# Patient Record
Sex: FEMALE | Race: WHITE | NOT HISPANIC OR LATINO | URBAN - METROPOLITAN AREA
[De-identification: names, ages, dates, MRNs, and addresses within clinical notes are randomized per-mention and may not be internally consistent; named-entity substitution may affect disease eponyms.]

---

## 2020-01-11 ENCOUNTER — CLINICAL SUPPORT (OUTPATIENT)
Dept: URGENT CARE | Facility: CLINIC | Age: 20
End: 2020-01-11

## 2020-01-11 VITALS
HEIGHT: 64 IN | BODY MASS INDEX: 35.17 KG/M2 | WEIGHT: 206 LBS | RESPIRATION RATE: 18 BRPM | DIASTOLIC BLOOD PRESSURE: 88 MMHG | TEMPERATURE: 98 F | SYSTOLIC BLOOD PRESSURE: 134 MMHG | HEART RATE: 98 BPM | OXYGEN SATURATION: 98 %

## 2020-01-11 DIAGNOSIS — L03.114 CELLULITIS OF LEFT UPPER EXTREMITY: Primary | ICD-10-CM

## 2020-01-11 DIAGNOSIS — L30.9 DERMATITIS: ICD-10-CM

## 2020-01-11 PROCEDURE — 99203 PR OFFICE/OUTPT VISIT, NEW, LEVL III, 30-44 MIN: ICD-10-PCS | Mod: S$GLB,,, | Performed by: NURSE PRACTITIONER

## 2020-01-11 PROCEDURE — 99203 OFFICE O/P NEW LOW 30 MIN: CPT | Mod: S$GLB,,, | Performed by: NURSE PRACTITIONER

## 2020-01-11 RX ORDER — CEPHALEXIN 500 MG/1
500 CAPSULE ORAL 4 TIMES DAILY
Qty: 20 CAPSULE | Refills: 0 | Status: SHIPPED | OUTPATIENT
Start: 2020-01-11 | End: 2020-01-16

## 2020-01-11 NOTE — PROGRESS NOTES
"Subjective:       Patient ID: Marizol Corbin is a 19 y.o. female.    Vitals:  height is 5' 4" (1.626 m) and weight is 93.4 kg (206 lb). Her temperature is 97.5 °F (36.4 °C). Her blood pressure is 134/88 and her pulse is 98. Her respiration is 18 and oxygen saturation is 98%.     Chief Complaint: Recurrent Skin Infections    Marizol Corbin is a 19 year old female presenting to the clinic with a slightly pruritic rash to the left forearm. She noticed it 1 week ago- rash around tattoo that pt just got a month ago. She has had no fever or drainage from the wound.    Tried using neosporin- no relief       Constitution: Negative for chills, fatigue and fever.   HENT: Negative for congestion and sore throat.    Neck: Negative for painful lymph nodes.   Cardiovascular: Negative for chest pain and leg swelling.   Eyes: Negative for double vision and blurred vision.   Respiratory: Negative for cough and shortness of breath.    Gastrointestinal: Negative for nausea, vomiting and diarrhea.   Genitourinary: Negative for dysuria, frequency, urgency and history of kidney stones.   Musculoskeletal: Negative for joint pain, joint swelling, muscle cramps and muscle ache.   Skin: Positive for lesion and erythema. Negative for color change, pale, rash and bruising.   Allergic/Immunologic: Negative for seasonal allergies.   Neurological: Negative for dizziness, history of vertigo, light-headedness, passing out and headaches.   Hematologic/Lymphatic: Negative for swollen lymph nodes.   Psychiatric/Behavioral: Negative for nervous/anxious, sleep disturbance and depression. The patient is not nervous/anxious.        Objective:      Physical Exam   Constitutional: She is oriented to person, place, and time. She appears well-developed and well-nourished. She is cooperative.  Non-toxic appearance. She does not appear ill. No distress.   HENT:   Head: Normocephalic and atraumatic.   Right Ear: Hearing, tympanic membrane, external ear and ear " canal normal.   Left Ear: Hearing, tympanic membrane, external ear and ear canal normal.   Nose: Nose normal. No mucosal edema, rhinorrhea or nasal deformity. No epistaxis. Right sinus exhibits no maxillary sinus tenderness and no frontal sinus tenderness. Left sinus exhibits no maxillary sinus tenderness and no frontal sinus tenderness.   Mouth/Throat: Uvula is midline, oropharynx is clear and moist and mucous membranes are normal. No trismus in the jaw. Normal dentition. No uvula swelling. No posterior oropharyngeal erythema.   Eyes: Conjunctivae and lids are normal. Right eye exhibits no discharge. Left eye exhibits no discharge. No scleral icterus.   Neck: Trachea normal, normal range of motion, full passive range of motion without pain and phonation normal. Neck supple.   Cardiovascular: Normal rate, regular rhythm, normal heart sounds, intact distal pulses and normal pulses.   Pulmonary/Chest: Effort normal and breath sounds normal. No respiratory distress.   Abdominal: Soft. Normal appearance and bowel sounds are normal. She exhibits no distension, no pulsatile midline mass and no mass. There is no tenderness.   Musculoskeletal: Normal range of motion. She exhibits no edema or deformity.   Neurological: She is alert and oriented to person, place, and time. She exhibits normal muscle tone. Coordination normal.   Skin: Skin is warm, dry, intact, not diaphoretic and not pale.   4 cm x 4 cm erythema over tattoo on left forearm. Small vesicles noted Lesions:  erythema  Psychiatric: She has a normal mood and affect. Her speech is normal and behavior is normal. Judgment and thought content normal. Cognition and memory are normal.   Nursing note and vitals reviewed.        Assessment:       1. Cellulitis of left upper extremity    2. Dermatitis        Plan:       Patient presents with cellulitis vs. Dermatitis over tattoo. No induration/fluctuance requiring I&D. Will start keflex and have patient start using  hydrocortisone if no improvement within 48 hours. Return as needed for recheck.   Cellulitis of left upper extremity    Dermatitis    Other orders  -     cephALEXin (KEFLEX) 500 MG capsule; Take 1 capsule (500 mg total) by mouth 4 (four) times daily. for 5 days  Dispense: 20 capsule; Refill: 0

## 2020-03-04 ENCOUNTER — CLINICAL SUPPORT (OUTPATIENT)
Dept: URGENT CARE | Facility: CLINIC | Age: 20
End: 2020-03-04

## 2020-03-04 VITALS
DIASTOLIC BLOOD PRESSURE: 79 MMHG | SYSTOLIC BLOOD PRESSURE: 121 MMHG | RESPIRATION RATE: 18 BRPM | TEMPERATURE: 99 F | BODY MASS INDEX: 34.49 KG/M2 | HEART RATE: 102 BPM | HEIGHT: 64 IN | WEIGHT: 202 LBS | OXYGEN SATURATION: 96 %

## 2020-03-04 DIAGNOSIS — R50.9 FEVER, UNSPECIFIED FEVER CAUSE: Primary | ICD-10-CM

## 2020-03-04 DIAGNOSIS — J10.1 INFLUENZA B: ICD-10-CM

## 2020-03-04 LAB
CTP QC/QA: YES
FLUAV AG NPH QL: NEGATIVE
FLUBV AG NPH QL: POSITIVE

## 2020-03-04 PROCEDURE — 87804 INFLUENZA ASSAY W/OPTIC: CPT | Mod: QW,,, | Performed by: NURSE PRACTITIONER

## 2020-03-04 PROCEDURE — 99214 PR OFFICE/OUTPT VISIT, EST, LEVL IV, 30-39 MIN: ICD-10-PCS | Mod: 25,TIER,S$GLB, | Performed by: NURSE PRACTITIONER

## 2020-03-04 PROCEDURE — 87804 POCT INFLUENZA A/B: ICD-10-PCS | Mod: 59,QW,, | Performed by: NURSE PRACTITIONER

## 2020-03-04 PROCEDURE — 99214 OFFICE O/P EST MOD 30 MIN: CPT | Mod: 25,TIER,S$GLB, | Performed by: NURSE PRACTITIONER

## 2020-03-04 RX ORDER — OSELTAMIVIR PHOSPHATE 75 MG/1
75 CAPSULE ORAL 2 TIMES DAILY
Qty: 10 CAPSULE | Refills: 0 | Status: SHIPPED | OUTPATIENT
Start: 2020-03-04 | End: 2020-03-09

## 2020-03-04 RX ORDER — FLUTICASONE PROPIONATE 50 MCG
2 SPRAY, SUSPENSION (ML) NASAL DAILY
Qty: 15.8 ML | Refills: 0 | Status: SHIPPED | OUTPATIENT
Start: 2020-03-04 | End: 2021-01-11 | Stop reason: SDUPTHER

## 2020-03-04 RX ORDER — LORATADINE 10 MG/1
10 TABLET ORAL DAILY
COMMUNITY
End: 2020-03-04 | Stop reason: ALTCHOICE

## 2020-03-04 RX ORDER — CETIRIZINE HYDROCHLORIDE 10 MG/1
10 TABLET ORAL DAILY
Qty: 30 TABLET | Refills: 0 | Status: SHIPPED | OUTPATIENT
Start: 2020-03-04 | End: 2020-04-03

## 2020-03-04 NOTE — PROGRESS NOTES
"Subjective:       Patient ID: Marizol Corbin is a 19 y.o. female.    Vitals:  height is 5' 4" (1.626 m) and weight is 91.6 kg (202 lb). Her oral temperature is 98.6 °F (37 °C). Her blood pressure is 121/79 and her pulse is 102. Her respiration is 18 and oxygen saturation is 96%.     Chief Complaint: Fever    Patient complains of sinus congestion, cough and fever that began yesterday.     Fever    This is a new problem. The current episode started yesterday. The problem occurs constantly. The problem has been gradually worsening. The maximum temperature noted was 101 to 101.9 F. The temperature was taken using an oral thermometer. Associated symptoms include congestion, coughing and muscle aches. Pertinent negatives include no abdominal pain, chest pain, diarrhea, headaches, nausea, rash, sore throat, urinary pain or vomiting. She has tried acetaminophen for the symptoms. The treatment provided no relief.       Constitution: Positive for fever. Negative for chills and fatigue.   HENT: Positive for congestion. Negative for sore throat.    Neck: Negative for painful lymph nodes.   Cardiovascular: Negative for chest pain and leg swelling.   Eyes: Negative for double vision and blurred vision.   Respiratory: Positive for cough. Negative for shortness of breath.    Gastrointestinal: Negative for abdominal pain, nausea, vomiting and diarrhea.   Endocrine: negative.   Genitourinary: Negative for dysuria, frequency, urgency and history of kidney stones.   Musculoskeletal: Negative for joint pain, joint swelling, muscle cramps and muscle ache.   Skin: Negative for color change, pale, rash and bruising.   Allergic/Immunologic: Negative for seasonal allergies.   Neurological: Negative for dizziness, history of vertigo, light-headedness, passing out and headaches.   Hematologic/Lymphatic: Negative for swollen lymph nodes.   Psychiatric/Behavioral: Negative for nervous/anxious, sleep disturbance and depression. The patient is " not nervous/anxious.        Objective:      Physical Exam   Constitutional: She is oriented to person, place, and time. She appears well-developed and well-nourished. She is cooperative.  Non-toxic appearance. She does not appear ill. No distress.   HENT:   Head: Normocephalic and atraumatic.   Right Ear: Hearing, tympanic membrane, external ear and ear canal normal.   Left Ear: Hearing, tympanic membrane, external ear and ear canal normal.   Nose: Mucosal edema and rhinorrhea present. No nasal deformity. No epistaxis. Right sinus exhibits no maxillary sinus tenderness and no frontal sinus tenderness. Left sinus exhibits no maxillary sinus tenderness and no frontal sinus tenderness.   Mouth/Throat: Uvula is midline, oropharynx is clear and moist and mucous membranes are normal. No trismus in the jaw. Normal dentition. No uvula swelling. Cobblestoning present. No posterior oropharyngeal erythema.   Eyes: Conjunctivae and lids are normal. Right eye exhibits no discharge. Left eye exhibits no discharge. No scleral icterus.   Neck: Trachea normal, normal range of motion, full passive range of motion without pain and phonation normal. Neck supple.   Cardiovascular: Normal rate, regular rhythm, normal heart sounds, intact distal pulses and normal pulses.   Pulmonary/Chest: Effort normal and breath sounds normal. No respiratory distress.   Abdominal: Soft. Normal appearance and bowel sounds are normal. She exhibits no distension, no pulsatile midline mass and no mass. There is no tenderness.   Musculoskeletal: Normal range of motion. She exhibits no edema or deformity.   Neurological: She is alert and oriented to person, place, and time. She exhibits normal muscle tone. Coordination normal. GCS eye subscore is 4. GCS verbal subscore is 5. GCS motor subscore is 6.   Skin: Skin is warm, dry, intact, not diaphoretic and not pale.   Psychiatric: She has a normal mood and affect. Her speech is normal and behavior is normal.  Judgment and thought content normal. Cognition and memory are normal.   Nursing note and vitals reviewed.        Assessment:       1. Fever, unspecified fever cause    2. Influenza B        Plan:       Influenza B positive.    Patient is flu positive. Discussed the importance of pushing fluids to stay hydrated, controlling fever with tylenol/motrin, and good handwashing to prevent spread of virus.    Fever, unspecified fever cause  -     POCT Influenza A/B    Influenza B    Other orders  -     cetirizine (ZYRTEC) 10 MG tablet; Take 1 tablet (10 mg total) by mouth once daily.  Dispense: 30 tablet; Refill: 0  -     fluticasone propionate (FLONASE) 50 mcg/actuation nasal spray; 2 sprays (100 mcg total) by Each Nostril route once daily.  Dispense: 15.8 mL; Refill: 0  -     dexchlorphen-phenylephrine-DM (POLYTUSSIN DM) 1-5-10 mg/5 mL Syrp; Take 5 mLs by mouth every 4 (four) hours as needed.  Dispense: 120 mL; Refill: 0  -     oseltamivir (TAMIFLU) 75 MG capsule; Take 1 capsule (75 mg total) by mouth 2 (two) times daily. for 5 days  Dispense: 10 capsule; Refill: 0

## 2020-03-04 NOTE — LETTER
March 4, 2020      [facility]  [address]  [facility] [address]  Phone: [phone]       Patient: Marizol Corbin   YOB: 2000  Date of Visit: 03/04/2020    To Whom It May Concern:    LUNA Corbin  was at [facility] on 03/04/2020. She may return to work/school on 3/11/20 with no restrictions. If you have any questions or concerns, or if I can be of further assistance, please do not hesitate to contact me.    Sincerely,    Shilpi Fay, NP

## 2020-03-04 NOTE — PATIENT INSTRUCTIONS
The Flu (Influenza)     The virus that causes the flu spreads through the air in droplets when someone who has the flu coughs, sneezes, laughs, or talks.   The flu (influenza) is an infection that affects your respiratory tract. This tract is made up of your mouth, nose, and lungs, and the passages between them. Unlike a cold, the flu can make you very ill. And it can lead to pneumonia, a serious lung infection. The flu can have serious complications and even cause death.  Who is at risk for the flu?  Anyone can get the flu. But you are more likely to become infected if you:  · Have a weakened immune system  · Work in a healthcare setting where you may be exposed to flu germs  · Live or work with someone who has the flu  · Havent had an annual flu shot  How does the flu spread?  The flu is caused by a virus. The virus spreads through the air in droplets when someone who has the flu coughs, sneezes, laughs, or talks. You can become infected when you inhale these viruses directly. You can also become infected when you touch a surface on which the droplets have landed and then transfer the germs to your eyes, nose, or mouth. Touching used tissues, or sharing utensils, drinking glasses, or a toothbrush from an infected person can expose you to flu viruses, too.  What are the symptoms of the flu?  Flu symptoms tend to come on quickly and may last a few days to a few weeks. They include:  · Fever usually higher than 100.4°F  (38°C) and chills  · Sore throat and headache  · Dry cough  · Runny nose  · Tiredness and weakness  · Muscle aches  Who is at risk for flu complications?  For some people, the flu can be very serious. The risk for complications is greater for:  · Children younger than age 5  · Adults ages 65 and older  · People with a chronic illness such as diabetes or heart, kidney, or lung disease  · People who live in a nursing home or long-term care facility   How is the flu treated?  The flu usually gets  better after 7 days or so. In some cases, your healthcare provider may prescribe an antiviral medicine. This may help you get well a little sooner. For the medicine to help, you need to take it as soon as possible (ideally within 48 hours) after your symptoms start. If you develop pneumonia or other serious illness, you may need to stay in the hospital.  Easing flu symptoms  · Drink lots of fluids such as water, juice, and warm soup. A good rule is to drink enough so that you urinate your normal amount.  · Get plenty of rest.  · Ask your healthcare provider what to take for fever and pain.  · Call your provider if your fever is 100.4°F (38°C) or higher, or you become dizzy, lightheaded, or short of breath.  Taking steps to protect others  · Wash your hands often, especially after coughing or sneezing. Or clean your hands with an alcohol-based hand  containing at least 60% alcohol.  · Cough or sneeze into a tissue. Then throw the tissue away and wash your hands. If you dont have a tissue, cough and sneeze into your elbow.  · Stay home until at least 24 hours after you no longer have a fever or chills. Be sure the fever isnt being hidden by fever-reducing medicine.  · Dont share food, utensils, drinking glasses, or a toothbrush with others.  · Ask your healthcare provider if others in your household should get antiviral medicine to help them avoid infection.  How can the flu be prevented?  · One of the best ways to avoid the flu is to get a flu vaccine each year. The virus that causes the flu changes from year to year. For that reason, healthcare providers recommend getting the flu vaccine each year, as soon as it's available in your area. The vaccine is given as a shot. Your healthcare provider can tell you which vaccine is right for you. A nasal spray is also available but is not recommended for the 8963-1303 flu season. The CDC says this is because the nasal spray did not seem to protect against the flu  over the last several flu seasons. In the past, it was meant for people ages 2 to 49.  · Wash your hands often. Frequent handwashing is a proven way to help prevent infection.  · Carry an alcohol-based hand gel containing at least 60% alcohol. Use it when you can't use soap and water. Then wash your hands as soon as you can.  · Avoid touching your eyes, nose, and mouth.  · At home and work, clean phones, computer keyboards, and toys often with disinfectant wipes.  · If possible, avoid close contact with others who have the flu or symptoms of the flu.  Handwashing tips  Handwashing is one of the best ways to prevent many common infections. If you are caring for or visiting someone with the flu, wash your hands each time you enter and leave the room. Follow these steps:  · Use warm water and plenty of soap. Rub your hands together well.  · Clean the whole hand, including under your nails, between your fingers, and up the wrists.  · Wash for at least 15 seconds.  · Rinse, letting the water run down your fingers, not up your wrists.  · Dry your hands well. Use a paper towel to turn off the faucet and open the door.  Using alcohol-based hand   Alcohol-based hand  are also a good choice. Use them when you can't use soap and water. Follow these steps:  · Squeeze about a tablespoon of gel into the palm of one hand.  · Rub your hands together briskly, cleaning the backs of your hands, the palms, between your fingers, and up the wrists.  · Rub until the gel is gone and your hands are completely dry.  Preventing the flu in healthcare settings  The flu is a special concern for people in hospitals and long-term care facilities. To help prevent the spread of flu, many hospitals and nursing homes take these steps:  · Healthcare providers wash their hands or use an alcohol-based hand  before and after treating each patient.  · People with the flu have private rooms and bathrooms or share a room with someone  with the same infection.  · People who are at high risk for the flu but don't have it are encouraged to get the flu and pneumonia vaccines.  · All healthcare workers are encouraged or required to get flu shots.   Date Last Reviewed: 12/1/2016 © 2000-2017 [employer]. [address] [place] [address]. All rights reserved. This information is not intended as a substitute for professional medical care. Always follow your healthcare professional's instructions.        Fever Control (Adult)  A fever is a normal reaction of your body to an illness. The temperature itself usually isnt harmful.  It actually helps your body fight infections. You dont need to treat a fever unless you feel very uncomfortable.   Home care  Follow these tips to take care of yourself at home:  · If you feel warm, check your temperature.  · Dress in light clothing. This will help you lose extra body heat through your skin. The fever will go up if you wear extra layers or wrap in blankets.  · Fever causes your body to lose water through evaporation. Drink plenty of fluids. These include water, juice, clear sodas, ginger ale, or lemonade.  Fever medicines  You can take acetaminophen every 4 to 6 hours if:  · You feel very uncomfortable  · Your oral temperature is 100.4ºF (38ºC) or higher  If you cant take or keep down oral medicine, ask your pharmacist for acetaminophen suppositories. You dont need a prescription for these.  If the fever doesnt get better within 1 hour after you take acetaminophen, take ibuprofen. If this works, keep taking the ibuprofen every 6 to 8 hours.  Note: If you have chronic liver or kidney disease, talk with your healthcare provider before taking these medicines. Also talk with your provider if you ever had a stomach ulcer or GI (gastrointestinal) bleeding.  If either medicine alone doesnt keep the fever down, you may switch off between the 2 medicines every 3 to 4 hours. But do this only if  your healthcare provider has told you to. For example, take ibuprofen. Wait 3 hours. Then take acetaminophen. Wait 3 hours. Take ibuprofen, and so on. Follow your providers instructions exactly.  Note: Do not give aspirin to anyone younger than age 19 who is ill with a fever. Aspirin can cause serious side effects such as liver damage and Reye syndrome. Although rare, Reye syndrome is a very serious illness usually found in children younger than age 15. The syndrome is closely linked to the use of aspirin or aspirin-containing medicine during viral infection.  Follow-up care  Follow up with your healthcare provider if you don't get better after 48 hours.  When to seek medical advice  Call your healthcare provider right away if any of these occur:  · Fever, as directed by your healthcare provider, or:  ¨ Fever of 100.4°F (38°C) or above lasting for 24 to 48 hours  ¨ Fever lasting more than 3 days, even without other symptoms  ¨ Fever that happens after visiting a foreign country  ¨ Fever that happens within a month after visiting a country with malaria. Malaria is a serious illness. A fever can still be malaria even if you took medicine to prevent it. The medicine does not work in all cases.  · Confusion or trouble thinking  · Headache or stiff neck  · Flat, small, purplish red spots on your skin  · Low blood pressure  · Fast heart rate  · Fast (rapid) breathing  · You are pregnant  · You just had surgery, another medical procedure, or were just discharged from the hospital  · Use of medicines that suppress the immune system (immunosuppressants). These include Prednisone, cancer medicines, and organ transplant rejection medicines. If you are not sure about whether your medicines suppress your immune system, ask your healthcare provider.  Call 911  Someone should call 911 if you:  · Are having trouble breathing or shortness of breath  · Are unresponsive  Important reminder  Call your healthcare provider if you get a  fever after visiting a place where infectious diseases are common. Many people  a cold or other virus while traveling. This usually goes away without a problem. But, some places have more serious diseases. Fever with certain other symptoms may mean you have a serious illness. Symptoms to watch for include diarrhea, skin rashes, insect bites, and skin boils, or infections. Your provider may ask you:  · What you did on your trip  · How long you were there  · Where you stayed (hotel, native house, tent)  · What you ate and drank  · If you were bitten by insects or other bugs  · If you swam in freshwater  · If you had sex or got a tattoo or piercing while you were there  Check the Centers for Disease Control and Prevention to get more information about specific infectious diseases in the areas you have traveled.  Date Last Reviewed: 1/1/2017  © 1237-4936 [employer]. [address] [place] [address], [place] [address]. All rights reserved. This information is not intended as a substitute for professional medical care. Always follow your healthcare professional's instructions.

## 2020-05-20 ENCOUNTER — CLINICAL SUPPORT (OUTPATIENT)
Dept: URGENT CARE | Facility: CLINIC | Age: 20
End: 2020-05-20

## 2020-05-20 VITALS
RESPIRATION RATE: 19 BRPM | HEIGHT: 64 IN | BODY MASS INDEX: 34.83 KG/M2 | TEMPERATURE: 98 F | HEART RATE: 83 BPM | OXYGEN SATURATION: 99 % | DIASTOLIC BLOOD PRESSURE: 75 MMHG | WEIGHT: 204 LBS | SYSTOLIC BLOOD PRESSURE: 124 MMHG

## 2020-05-20 DIAGNOSIS — H60.503 ACUTE OTITIS EXTERNA OF BOTH EARS, UNSPECIFIED TYPE: Primary | ICD-10-CM

## 2020-05-20 PROCEDURE — 99214 OFFICE O/P EST MOD 30 MIN: CPT | Mod: TIER,S$GLB,, | Performed by: NURSE PRACTITIONER

## 2020-05-20 PROCEDURE — 99214 PR OFFICE/OUTPT VISIT, EST, LEVL IV, 30-39 MIN: ICD-10-PCS | Mod: TIER,S$GLB,, | Performed by: NURSE PRACTITIONER

## 2020-05-20 RX ORDER — NEOMYCIN SULFATE, POLYMYXIN B SULFATE, HYDROCORTISONE 3.5; 10000; 1 MG/ML; [USP'U]/ML; MG/ML
4 SOLUTION/ DROPS AURICULAR (OTIC) 3 TIMES DAILY
Qty: 10 ML | Refills: 0 | Status: SHIPPED | OUTPATIENT
Start: 2020-05-20 | End: 2020-05-30

## 2020-05-20 NOTE — PATIENT INSTRUCTIONS

## 2020-05-20 NOTE — PROGRESS NOTES
"Subjective:       Patient ID: Marizol Corbin is a 19 y.o. female.    Vitals:  height is 5' 4" (1.626 m) and weight is 92.5 kg (204 lb). Her temperature is 97.6 °F (36.4 °C). Her blood pressure is 124/75 and her pulse is 83. Her respiration is 19 and oxygen saturation is 99%.     Chief Complaint: Otalgia    Otalgia    There is pain in the left ear. The current episode started more than 1 month ago. Pertinent negatives include no coughing, diarrhea, headaches, rash, sore throat or vomiting. Associated symptoms comments: Ear pain that radiates to jaw, headache. Treatments tried: sinus med  The treatment provided no relief.       Constitution: Negative for appetite change, chills and fever.   HENT: Positive for ear pain. Negative for congestion and sore throat.    Neck: Negative for painful lymph nodes.   Eyes: Negative for eye discharge and eye redness.   Respiratory: Negative for cough.    Gastrointestinal: Negative for vomiting and diarrhea.   Genitourinary: Negative for dysuria.   Musculoskeletal: Negative for muscle ache.   Skin: Negative for rash and erythema.   Neurological: Negative for headaches and seizures.   Hematologic/Lymphatic: Negative for swollen lymph nodes.       Objective:      Physical Exam   Constitutional: She is oriented to person, place, and time. She appears well-developed and well-nourished.   HENT:   Head: Normocephalic and atraumatic. Head is without abrasion, without contusion and without laceration.   Right Ear: Hearing, tympanic membrane, external ear and ear canal normal. There is tenderness (erythematous and swollen to ear canal).   Left Ear: Hearing, tympanic membrane, external ear and ear canal normal. There is tenderness (erythematous and swollen to ear canal).   Nose: Nose normal.   Mouth/Throat: Oropharynx is clear and moist and mucous membranes are normal.   Eyes: Pupils are equal, round, and reactive to light. Conjunctivae, EOM and lids are normal.   Neck: Trachea normal, full " passive range of motion without pain and phonation normal. Neck supple.   Cardiovascular: Normal rate, regular rhythm and normal heart sounds.   Pulmonary/Chest: Effort normal and breath sounds normal. No stridor. No respiratory distress.   Musculoskeletal: Normal range of motion.   Neurological: She is alert and oriented to person, place, and time.   Skin: Skin is warm, dry, intact and no rash. Capillary refill takes less than 2 seconds. abrasion, burn, bruising, erythema and ecchymosis  Psychiatric: She has a normal mood and affect. Her speech is normal and behavior is normal. Judgment and thought content normal. Cognition and memory are normal.   Nursing note and vitals reviewed.        Assessment:       1. Acute otitis externa of both ears, unspecified type        Plan:         Acute otitis externa of both ears, unspecified type    Other orders  -     neomycin-polymyxin-hydrocortisone (CORTISPORIN) otic solution; Place 4 drops into both ears 3 (three) times daily. for 10 days  Dispense: 10 mL; Refill: 0

## 2021-01-11 ENCOUNTER — OFFICE VISIT (OUTPATIENT)
Dept: URGENT CARE | Facility: CLINIC | Age: 21
End: 2021-01-11
Payer: MEDICAID

## 2021-01-11 VITALS
OXYGEN SATURATION: 99 % | SYSTOLIC BLOOD PRESSURE: 124 MMHG | DIASTOLIC BLOOD PRESSURE: 78 MMHG | BODY MASS INDEX: 35 KG/M2 | HEIGHT: 64 IN | WEIGHT: 205 LBS | TEMPERATURE: 98 F | RESPIRATION RATE: 16 BRPM | HEART RATE: 94 BPM

## 2021-01-11 DIAGNOSIS — H92.02 OTALGIA OF LEFT EAR: Primary | ICD-10-CM

## 2021-01-11 PROCEDURE — 99214 PR OFFICE/OUTPT VISIT, EST, LEVL IV, 30-39 MIN: ICD-10-PCS | Mod: S$GLB,,, | Performed by: NURSE PRACTITIONER

## 2021-01-11 PROCEDURE — 99214 OFFICE O/P EST MOD 30 MIN: CPT | Mod: S$GLB,,, | Performed by: NURSE PRACTITIONER

## 2021-01-11 RX ORDER — FLUTICASONE PROPIONATE 50 MCG
2 SPRAY, SUSPENSION (ML) NASAL DAILY
Qty: 15.8 ML | Refills: 0 | Status: SHIPPED | OUTPATIENT
Start: 2021-01-11

## 2021-01-11 RX ORDER — DEXAMETHASONE SODIUM PHOSPHATE 4 MG/ML
8 INJECTION, SOLUTION INTRA-ARTICULAR; INTRALESIONAL; INTRAMUSCULAR; INTRAVENOUS; SOFT TISSUE
Status: COMPLETED | OUTPATIENT
Start: 2021-01-11 | End: 2021-01-11

## 2021-01-11 RX ORDER — GUAIFENESIN 1200 MG/1
1200 TABLET, EXTENDED RELEASE ORAL 2 TIMES DAILY
Qty: 20 TABLET | Refills: 0 | Status: SHIPPED | OUTPATIENT
Start: 2021-01-11 | End: 2021-01-21

## 2021-01-11 RX ADMIN — DEXAMETHASONE SODIUM PHOSPHATE 8 MG: 4 INJECTION, SOLUTION INTRA-ARTICULAR; INTRALESIONAL; INTRAMUSCULAR; INTRAVENOUS; SOFT TISSUE at 01:01

## 2021-10-20 ENCOUNTER — OFFICE VISIT (OUTPATIENT)
Dept: URGENT CARE | Facility: CLINIC | Age: 21
End: 2021-10-20
Payer: MEDICAID

## 2021-10-20 VITALS
SYSTOLIC BLOOD PRESSURE: 118 MMHG | HEART RATE: 88 BPM | BODY MASS INDEX: 31.92 KG/M2 | RESPIRATION RATE: 16 BRPM | HEIGHT: 64 IN | TEMPERATURE: 98 F | DIASTOLIC BLOOD PRESSURE: 72 MMHG | WEIGHT: 187 LBS | OXYGEN SATURATION: 99 %

## 2021-10-20 DIAGNOSIS — B00.1 COLD SORE: Primary | ICD-10-CM

## 2021-10-20 PROCEDURE — 99214 PR OFFICE/OUTPT VISIT, EST, LEVL IV, 30-39 MIN: ICD-10-PCS | Mod: S$GLB,,, | Performed by: PHYSICIAN ASSISTANT

## 2021-10-20 PROCEDURE — 99214 OFFICE O/P EST MOD 30 MIN: CPT | Mod: S$GLB,,, | Performed by: PHYSICIAN ASSISTANT

## 2021-10-20 RX ORDER — MUPIROCIN 20 MG/G
OINTMENT TOPICAL 3 TIMES DAILY
Qty: 30 G | Refills: 0 | Status: SHIPPED | OUTPATIENT
Start: 2021-10-20 | End: 2021-10-27

## 2021-10-20 RX ORDER — ACYCLOVIR 400 MG/1
400 TABLET ORAL 3 TIMES DAILY
Qty: 30 TABLET | Refills: 0 | Status: SHIPPED | OUTPATIENT
Start: 2021-10-20 | End: 2021-10-30

## 2022-10-01 ENCOUNTER — OFFICE VISIT (OUTPATIENT)
Dept: URGENT CARE | Facility: CLINIC | Age: 22
End: 2022-10-01
Payer: MEDICAID

## 2022-10-01 VITALS
OXYGEN SATURATION: 99 % | RESPIRATION RATE: 20 BRPM | HEIGHT: 65 IN | BODY MASS INDEX: 33.26 KG/M2 | WEIGHT: 199.63 LBS | SYSTOLIC BLOOD PRESSURE: 134 MMHG | DIASTOLIC BLOOD PRESSURE: 70 MMHG | HEART RATE: 79 BPM | TEMPERATURE: 97 F

## 2022-10-01 DIAGNOSIS — J02.9 SORE THROAT: Primary | ICD-10-CM

## 2022-10-01 DIAGNOSIS — J06.9 UPPER RESPIRATORY TRACT INFECTION, UNSPECIFIED TYPE: ICD-10-CM

## 2022-10-01 LAB
CTP QC/QA: YES
FLUAV AG NPH QL: NEGATIVE
FLUBV AG NPH QL: NEGATIVE
S PYO RRNA THROAT QL PROBE: NEGATIVE
SARS-COV-2 AG RESP QL IA.RAPID: NEGATIVE

## 2022-10-01 PROCEDURE — 87804 POCT INFLUENZA A/B: ICD-10-PCS | Mod: 59,QW,, | Performed by: NURSE PRACTITIONER

## 2022-10-01 PROCEDURE — 99214 OFFICE O/P EST MOD 30 MIN: CPT | Mod: S$GLB,,, | Performed by: NURSE PRACTITIONER

## 2022-10-01 PROCEDURE — 3075F PR MOST RECENT SYSTOLIC BLOOD PRESS GE 130-139MM HG: ICD-10-PCS | Mod: CPTII,S$GLB,, | Performed by: NURSE PRACTITIONER

## 2022-10-01 PROCEDURE — 3078F PR MOST RECENT DIASTOLIC BLOOD PRESSURE < 80 MM HG: ICD-10-PCS | Mod: CPTII,S$GLB,, | Performed by: NURSE PRACTITIONER

## 2022-10-01 PROCEDURE — 87880 POCT RAPID STREP A: ICD-10-PCS | Mod: QW,,, | Performed by: NURSE PRACTITIONER

## 2022-10-01 PROCEDURE — 87804 INFLUENZA ASSAY W/OPTIC: CPT | Mod: 59,QW,, | Performed by: NURSE PRACTITIONER

## 2022-10-01 PROCEDURE — 87811 SARS CORONAVIRUS 2 ANTIGEN POCT, MANUAL READ: ICD-10-PCS | Mod: QW,S$GLB,, | Performed by: NURSE PRACTITIONER

## 2022-10-01 PROCEDURE — 1159F PR MEDICATION LIST DOCUMENTED IN MEDICAL RECORD: ICD-10-PCS | Mod: CPTII,S$GLB,, | Performed by: NURSE PRACTITIONER

## 2022-10-01 PROCEDURE — 87811 SARS-COV-2 COVID19 W/OPTIC: CPT | Mod: QW,S$GLB,, | Performed by: NURSE PRACTITIONER

## 2022-10-01 PROCEDURE — 3075F SYST BP GE 130 - 139MM HG: CPT | Mod: CPTII,S$GLB,, | Performed by: NURSE PRACTITIONER

## 2022-10-01 PROCEDURE — 1159F MED LIST DOCD IN RCRD: CPT | Mod: CPTII,S$GLB,, | Performed by: NURSE PRACTITIONER

## 2022-10-01 PROCEDURE — 87880 STREP A ASSAY W/OPTIC: CPT | Mod: QW,,, | Performed by: NURSE PRACTITIONER

## 2022-10-01 PROCEDURE — 1160F RVW MEDS BY RX/DR IN RCRD: CPT | Mod: CPTII,S$GLB,, | Performed by: NURSE PRACTITIONER

## 2022-10-01 PROCEDURE — 99214 PR OFFICE/OUTPT VISIT, EST, LEVL IV, 30-39 MIN: ICD-10-PCS | Mod: S$GLB,,, | Performed by: NURSE PRACTITIONER

## 2022-10-01 PROCEDURE — 3078F DIAST BP <80 MM HG: CPT | Mod: CPTII,S$GLB,, | Performed by: NURSE PRACTITIONER

## 2022-10-01 PROCEDURE — 3008F PR BODY MASS INDEX (BMI) DOCUMENTED: ICD-10-PCS | Mod: CPTII,S$GLB,, | Performed by: NURSE PRACTITIONER

## 2022-10-01 PROCEDURE — 3008F BODY MASS INDEX DOCD: CPT | Mod: CPTII,S$GLB,, | Performed by: NURSE PRACTITIONER

## 2022-10-01 PROCEDURE — 1160F PR REVIEW ALL MEDS BY PRESCRIBER/CLIN PHARMACIST DOCUMENTED: ICD-10-PCS | Mod: CPTII,S$GLB,, | Performed by: NURSE PRACTITIONER

## 2022-10-01 RX ORDER — AZELASTINE 1 MG/ML
1 SPRAY, METERED NASAL 2 TIMES DAILY
Qty: 30 ML | Refills: 0 | Status: SHIPPED | OUTPATIENT
Start: 2022-10-01 | End: 2023-10-01

## 2022-10-01 RX ORDER — PROMETHAZINE HYDROCHLORIDE AND DEXTROMETHORPHAN HYDROBROMIDE 6.25; 15 MG/5ML; MG/5ML
5 SYRUP ORAL 3 TIMES DAILY PRN
Qty: 240 ML | Refills: 0 | Status: SHIPPED | OUTPATIENT
Start: 2022-10-01 | End: 2022-10-11

## 2022-10-01 RX ORDER — LORATADINE 10 MG/1
10 TABLET ORAL DAILY PRN
COMMUNITY

## 2022-10-01 RX ORDER — ALBUTEROL SULFATE 90 UG/1
2 AEROSOL, METERED RESPIRATORY (INHALATION) EVERY 6 HOURS PRN
Qty: 18 G | Refills: 0 | Status: SHIPPED | OUTPATIENT
Start: 2022-10-01 | End: 2023-10-01

## 2022-10-01 RX ORDER — FLUTICASONE PROPIONATE 50 MCG
1 SPRAY, SUSPENSION (ML) NASAL DAILY
Qty: 16 G | Refills: 0 | Status: SHIPPED | OUTPATIENT
Start: 2022-10-01

## 2022-10-01 NOTE — PROGRESS NOTES
"Subjective:       Patient ID: Marizol Corbin is a 22 y.o. female.    Vitals:  height is 5' 5" (1.651 m) and weight is 90.5 kg (199 lb 9.6 oz). Her temperature is 97 °F (36.1 °C). Her blood pressure is 134/70 and her pulse is 79. Her respiration is 20 and oxygen saturation is 99%.     Chief Complaint: Sore Throat    Pt states "c/o sore throat, cough, HA that  has been going on for 3 days. Took mucinex, Claritin."     Past Medical History:  No date: Anxiety  No date: Arrhythmia  No date: Depression    Past Surgical History:  No date: BUNIONECTOMY  No date: TONSILLECTOMY  No date: WISDOM TOOTH EXTRACTION    History reviewed.  No pertinent family history.      Social History    Socioeconomic History      Marital status: Single    Tobacco Use      Smoking status: Never      Current Outpatient Medications:  loratadine (CLARITIN) 10 mg tablet, Take 10 mg by mouth daily as needed for Allergies., Disp: , Rfl:   acyclovir (ZOVIRAX) 400 MG tablet, Take 1 tablet (400 mg total) by mouth 3 (three) times daily. for 10 days, Disp: 30 tablet, Rfl: 0  albuterol (VENTOLIN HFA) 90 mcg/actuation inhaler, Inhale 2 puffs into the lungs every 6 (six) hours as needed for Wheezing. Rescue, Disp: 18 g, Rfl: 0  azelastine (ASTELIN) 137 mcg (0.1 %) nasal spray, 1 spray (137 mcg total) by Nasal route 2 (two) times daily., Disp: 30 mL, Rfl: 0  cetirizine (ZYRTEC) 10 MG tablet, Take 1 tablet (10 mg total) by mouth once daily. (Patient not taking: Reported on 5/20/2020), Disp: 30 tablet, Rfl: 0  dexchlorphen-phenylephrine-DM (POLYTUSSIN DM) 1-5-10 mg/5 mL Syrp, Take 5 mLs by mouth every 4 (four) hours as needed. (Patient not taking: No sig reported), Disp: 120 mL, Rfl: 0  fluticasone propionate (FLONASE) 50 mcg/actuation nasal spray, 2 sprays (100 mcg total) by Each Nostril route once daily. (Patient not taking: No sig reported), Disp: 15.8 mL, Rfl: 0  fluticasone propionate (FLONASE) 50 mcg/actuation nasal spray, 1 spray (50 mcg total) by Each " Nostril route once daily., Disp: 16 g, Rfl: 0  promethazine-dextromethorphan (PROMETHAZINE-DM) 6.25-15 mg/5 mL Syrp, Take 5 mLs by mouth 3 (three) times daily as needed., Disp: 240 mL, Rfl: 0    No current facility-administered medications for this visit.      Review of patient's allergies indicates:   -- Augmentin [amoxicillin-pot clavulanate]    -- Oxycodone -- Itching     Sore Throat   Associated symptoms include congestion, coughing and headaches.     Constitution: Positive for fatigue. Negative for chills and fever.   HENT:  Positive for congestion, postnasal drip and sore throat.    Neck: neck negative.   Cardiovascular: Negative.    Respiratory:  Positive for chest tightness and cough. Negative for sputum production and wheezing.    Gastrointestinal: Negative.    Genitourinary: Negative.    Neurological:  Positive for headaches.     Objective:      Physical Exam   Constitutional: She is oriented to person, place, and time.  Non-toxic appearance. No distress.   HENT:   Head: Normocephalic and atraumatic.   Ears:   Right Ear: Tympanic membrane normal.   Left Ear: Tympanic membrane normal.   Nose: Rhinorrhea present.   Mouth/Throat: Posterior oropharyngeal erythema present.   Eyes: Pupils are equal, round, and reactive to light.   Cardiovascular: Normal rate and regular rhythm.   No murmur heard.  Pulmonary/Chest: Effort normal and breath sounds normal. No respiratory distress. She exhibits no tenderness.   Abdominal: Normal appearance and bowel sounds are normal. There is no abdominal tenderness.   Neurological: She is alert and oriented to person, place, and time.   Psychiatric: Her behavior is normal. Mood normal.       Assessment:       1. Sore throat    2. Upper respiratory tract infection, unspecified type          Plan:     COVID, FLU, and Strep negative. Reviewed with patient, treat as below, Follow up with PCP if symptoms are not resolving in 48-72 hours, follow up immediately for new or worsening  symptoms, ED precautions discussed.      Sore throat  -     SARS Coronavirus 2 Antigen, POCT Manual Read  -     POCT Influenza A/B  -     POCT rapid strep A  -     promethazine-dextromethorphan (PROMETHAZINE-DM) 6.25-15 mg/5 mL Syrp; Take 5 mLs by mouth 3 (three) times daily as needed.  Dispense: 240 mL; Refill: 0  -     azelastine (ASTELIN) 137 mcg (0.1 %) nasal spray; 1 spray (137 mcg total) by Nasal route 2 (two) times daily.  Dispense: 30 mL; Refill: 0  -     fluticasone propionate (FLONASE) 50 mcg/actuation nasal spray; 1 spray (50 mcg total) by Each Nostril route once daily.  Dispense: 16 g; Refill: 0  -     albuterol (VENTOLIN HFA) 90 mcg/actuation inhaler; Inhale 2 puffs into the lungs every 6 (six) hours as needed for Wheezing. Rescue  Dispense: 18 g; Refill: 0    Upper respiratory tract infection, unspecified type  -     promethazine-dextromethorphan (PROMETHAZINE-DM) 6.25-15 mg/5 mL Syrp; Take 5 mLs by mouth 3 (three) times daily as needed.  Dispense: 240 mL; Refill: 0  -     azelastine (ASTELIN) 137 mcg (0.1 %) nasal spray; 1 spray (137 mcg total) by Nasal route 2 (two) times daily.  Dispense: 30 mL; Refill: 0  -     fluticasone propionate (FLONASE) 50 mcg/actuation nasal spray; 1 spray (50 mcg total) by Each Nostril route once daily.  Dispense: 16 g; Refill: 0  -     albuterol (VENTOLIN HFA) 90 mcg/actuation inhaler; Inhale 2 puffs into the lungs every 6 (six) hours as needed for Wheezing. Rescue  Dispense: 18 g; Refill: 0

## 2023-07-07 ENCOUNTER — OFFICE VISIT (OUTPATIENT)
Dept: URGENT CARE | Facility: CLINIC | Age: 23
End: 2023-07-07
Payer: MEDICAID

## 2023-07-07 VITALS
SYSTOLIC BLOOD PRESSURE: 127 MMHG | DIASTOLIC BLOOD PRESSURE: 79 MMHG | HEIGHT: 65 IN | TEMPERATURE: 98 F | OXYGEN SATURATION: 99 % | HEART RATE: 75 BPM | WEIGHT: 190.63 LBS | BODY MASS INDEX: 31.76 KG/M2 | RESPIRATION RATE: 16 BRPM

## 2023-07-07 DIAGNOSIS — J06.9 UPPER RESPIRATORY TRACT INFECTION, UNSPECIFIED TYPE: Primary | ICD-10-CM

## 2023-07-07 DIAGNOSIS — R05.9 COUGH IN ADULT: ICD-10-CM

## 2023-07-07 DIAGNOSIS — R00.2 PALPITATIONS: ICD-10-CM

## 2023-07-07 PROCEDURE — 99214 OFFICE O/P EST MOD 30 MIN: CPT | Mod: S$GLB,,,

## 2023-07-07 PROCEDURE — 93000 ELECTROCARDIOGRAM COMPLETE: CPT | Mod: S$GLB,,,

## 2023-07-07 PROCEDURE — 99214 PR OFFICE/OUTPT VISIT, EST, LEVL IV, 30-39 MIN: ICD-10-PCS | Mod: S$GLB,,,

## 2023-07-07 PROCEDURE — 71046 X-RAY EXAM CHEST 2 VIEWS: CPT | Mod: S$GLB,,, | Performed by: RADIOLOGY

## 2023-07-07 PROCEDURE — 71046 XR CHEST PA AND LATERAL: ICD-10-PCS | Mod: S$GLB,,, | Performed by: RADIOLOGY

## 2023-07-07 PROCEDURE — 93000 PR ELECTROCARDIOGRAM, COMPLETE: ICD-10-PCS | Mod: S$GLB,,,

## 2023-07-07 RX ORDER — AZELASTINE 1 MG/ML
1 SPRAY, METERED NASAL 2 TIMES DAILY
Qty: 30 ML | Refills: 0 | Status: SHIPPED | OUTPATIENT
Start: 2023-07-07 | End: 2024-07-06

## 2023-07-07 RX ORDER — BENZONATATE 100 MG/1
100 CAPSULE ORAL 3 TIMES DAILY PRN
Qty: 30 CAPSULE | Refills: 0 | Status: SHIPPED | OUTPATIENT
Start: 2023-07-07 | End: 2023-07-17

## 2023-07-07 RX ORDER — ALBUTEROL SULFATE 90 UG/1
2 AEROSOL, METERED RESPIRATORY (INHALATION) EVERY 6 HOURS PRN
Qty: 6.7 G | Refills: 0 | Status: SHIPPED | OUTPATIENT
Start: 2023-07-07 | End: 2023-08-06

## 2023-07-07 RX ORDER — PROMETHAZINE HYDROCHLORIDE AND DEXTROMETHORPHAN HYDROBROMIDE 6.25; 15 MG/5ML; MG/5ML
5 SYRUP ORAL NIGHTLY PRN
Qty: 50 ML | Refills: 0 | Status: SHIPPED | OUTPATIENT
Start: 2023-07-07 | End: 2023-07-17

## 2023-07-07 RX ORDER — CETIRIZINE HYDROCHLORIDE 10 MG/1
10 TABLET ORAL DAILY
Qty: 30 TABLET | Refills: 0 | Status: SHIPPED | OUTPATIENT
Start: 2023-07-07 | End: 2023-11-14 | Stop reason: SDUPTHER

## 2023-07-07 NOTE — PROGRESS NOTES
"Subjective:      Patient ID: Marizol Corbin is a 22 y.o. female.    Vitals:  height is 5' 5" (1.651 m) and weight is 86.5 kg (190 lb 9.6 oz). Her oral temperature is 98.2 °F (36.8 °C). Her blood pressure is 127/79 and her pulse is 75. Her respiration is 16 and oxygen saturation is 99%.     Chief Complaint: Cough    Marizol, presents today with a chief complaint of a nasal congestion, left ear pressure, postnasal drip, sore throat, and productive cough x1 week.  She reports her cough is worse at nighttime.  She has a past medical history of seasonal and environmental allergies.  She also has a childhood past medical problem of unspecified arrhythmia.  She is not currently on any medications for an arrhythmia, , and has not seen a cardiologist in 5 years.  She states she was previously on Allegra, but medication exacerbated her palpitations and ultimately discontinued it.  She is tried other antihistamines unsuccessfully.  She reports prior to symptoms starting she was dog sitting.  She reports that her allergies from dog dander typically resolves after 3 days.  On chart review, patient has previously been prescribed albuterol with no adverse reactions.  Patient does report early-onset family heart disease on her father's side.    EKG performed in clinic interpreted by myself with a rate of 84, no STEMI present.  There is T-wave inversions in lead 3, AVF, and V3.  No old EKG in chart for comparison.      Cough  This is a new problem. The current episode started 1 to 4 weeks ago (a week ago). The problem has been gradually worsening. The problem occurs constantly. The cough is Productive of sputum. Associated symptoms include ear pain (Left), nasal congestion, postnasal drip, rhinorrhea, a sore throat and shortness of breath. Pertinent negatives include no fever or rash. Associated symptoms comments: Weakness/Fatigue. The symptoms are aggravated by animals, dust and pollens. She has tried OTC cough suppressant (Afrin) " for the symptoms. The treatment provided no relief. Her past medical history is significant for environmental allergies.     Constitution: Negative for fever.   HENT:  Positive for ear pain (Left), congestion, postnasal drip and sore throat. Negative for sinus pain.    Cardiovascular:  Positive for palpitations.   Eyes:  Negative for eye itching.   Respiratory:  Positive for cough, sputum production (Yellow) and shortness of breath. Negative for asthma.    Gastrointestinal:  Negative for nausea, vomiting and diarrhea.   Endocrine: cold intolerance and heat intolerance.   Genitourinary:  Negative for dysuria, frequency and urgency.   Musculoskeletal:  Negative for pain.   Skin:  Negative for rash.   Allergic/Immunologic: Positive for environmental allergies, seasonal allergies and sneezing. Negative for asthma.   Neurological:  Negative for disorientation.   Hematologic/Lymphatic: Negative for easy bruising/bleeding. Does not bruise/bleed easily.   Psychiatric/Behavioral:  Negative for disorientation.     Objective:     Physical Exam   Constitutional: She is oriented to person, place, and time. She appears well-developed. She is cooperative.  Non-toxic appearance. She does not appear ill. No distress. normal  HENT:   Head: Normocephalic and atraumatic.   Ears:   Right Ear: Hearing, tympanic membrane, external ear and ear canal normal.   Left Ear: Hearing, external ear and ear canal normal. Tympanic membrane is retracted.   Nose: Nose normal. No mucosal edema or nasal deformity. No epistaxis. Right sinus exhibits no maxillary sinus tenderness and no frontal sinus tenderness. Left sinus exhibits no maxillary sinus tenderness and no frontal sinus tenderness.   Mouth/Throat: Uvula is midline, oropharynx is clear and moist and mucous membranes are normal. Mucous membranes are moist. No trismus in the jaw. Normal dentition. No uvula swelling. No oropharyngeal exudate. Oropharynx is clear.   Eyes: Conjunctivae and lids are  normal. Pupils are equal, round, and reactive to light. Extraocular movement intact   Neck: Trachea normal and phonation normal. Neck supple.   Cardiovascular: Normal rate, regular rhythm, normal heart sounds and normal pulses.   Pulmonary/Chest: Effort normal and breath sounds normal.   Abdominal: Normal appearance. flat abdomen   Musculoskeletal: Normal range of motion.         General: Normal range of motion.   Neurological: no focal deficit. She is alert, oriented to person, place, and time and at baseline. She exhibits normal muscle tone.   Skin: Skin is warm, dry, intact and not diaphoretic. Capillary refill takes 2 to 3 seconds.   Psychiatric: Her speech is normal and behavior is normal. Mood, judgment and thought content normal.   Nursing note and vitals reviewed.    Assessment:     1. Upper respiratory tract infection, unspecified type    2. Palpitations    3. Cough in adult        Plan:       Upper respiratory tract infection, unspecified type  -     azelastine (ASTELIN) 137 mcg (0.1 %) nasal spray; 1 spray (137 mcg total) by Nasal route 2 (two) times daily.  Dispense: 30 mL; Refill: 0  -     benzonatate (TESSALON) 100 MG capsule; Take 1 capsule (100 mg total) by mouth 3 (three) times daily as needed for Cough.  Dispense: 30 capsule; Refill: 0  -     cetirizine (ZYRTEC) 10 MG tablet; Take 1 tablet (10 mg total) by mouth once daily.  Dispense: 30 tablet; Refill: 0  -     promethazine-dextromethorphan (PROMETHAZINE-DM) 6.25-15 mg/5 mL Syrp; Take 5 mLs by mouth nightly as needed (night time cough).  Dispense: 50 mL; Refill: 0  -     albuterol (PROVENTIL HFA) 90 mcg/actuation inhaler; Inhale 2 puffs into the lungs every 6 (six) hours as needed for Wheezing. Rescue  Dispense: 6.7 g; Refill: 0  -     benzocaine-menthoL 15-3.6 mg Lozg; 1 lozenge by Mucous Membrane route as needed (as directed on label).  Dispense: 18 lozenge; Refill: 0    Palpitations  -     EKG 12-lead; Future  -     Ambulatory referral/consult  to Cardiology    Cough in adult  -     XR CHEST PA AND LATERAL; Future; Expected date: 07/07/2023  -     benzonatate (TESSALON) 100 MG capsule; Take 1 capsule (100 mg total) by mouth 3 (three) times daily as needed for Cough.  Dispense: 30 capsule; Refill: 0  -     promethazine-dextromethorphan (PROMETHAZINE-DM) 6.25-15 mg/5 mL Syrp; Take 5 mLs by mouth nightly as needed (night time cough).  Dispense: 50 mL; Refill: 0  -     albuterol (PROVENTIL HFA) 90 mcg/actuation inhaler; Inhale 2 puffs into the lungs every 6 (six) hours as needed for Wheezing. Rescue  Dispense: 6.7 g; Refill: 0           Narrative & Impression  EXAMINATION:  XR CHEST PA AND LATERAL     CLINICAL HISTORY:  Cough, unspecified     TECHNIQUE:  PA and lateral views of the chest were performed.     COMPARISON:  None     FINDINGS:  The heart is not enlarged.  The lungs are well expanded and clear.  The costophrenic sulci are sharp.  Slight rightward convexity midthoracic spine and leftward convexity lower thoracic spine.     Impression:     No acute cardiopulmonary disease.        Electronically signed by: Irene Farris MD  Date:                                            07/07/2023  Time:                                           10:41     Follow-up with PCP in 1 week if no improvement of symptoms

## 2023-07-07 NOTE — PATIENT INSTRUCTIONS
A cardiology referral has been placed for you.    Follow-up with PCP in 1 week if no improvement in symptoms.

## 2023-07-24 ENCOUNTER — PATIENT MESSAGE (OUTPATIENT)
Dept: FAMILY MEDICINE | Facility: CLINIC | Age: 23
End: 2023-07-24
Payer: COMMERCIAL

## 2023-11-14 ENCOUNTER — OFFICE VISIT (OUTPATIENT)
Dept: URGENT CARE | Facility: CLINIC | Age: 23
End: 2023-11-14
Payer: MEDICAID

## 2023-11-14 VITALS
RESPIRATION RATE: 20 BRPM | HEIGHT: 65 IN | WEIGHT: 201 LBS | TEMPERATURE: 98 F | DIASTOLIC BLOOD PRESSURE: 75 MMHG | BODY MASS INDEX: 33.49 KG/M2 | OXYGEN SATURATION: 98 % | HEART RATE: 48 BPM | SYSTOLIC BLOOD PRESSURE: 118 MMHG

## 2023-11-14 DIAGNOSIS — J06.9 VIRAL URI: Primary | ICD-10-CM

## 2023-11-14 DIAGNOSIS — R05.9 COUGH, UNSPECIFIED TYPE: ICD-10-CM

## 2023-11-14 LAB
CTP QC/QA: YES
CTP QC/QA: YES
FLUAV AG NPH QL: NEGATIVE
FLUBV AG NPH QL: NEGATIVE
SARS-COV-2 AG RESP QL IA.RAPID: NEGATIVE

## 2023-11-14 PROCEDURE — 87804 POCT INFLUENZA A/B: ICD-10-PCS | Mod: QW,,, | Performed by: NURSE PRACTITIONER

## 2023-11-14 PROCEDURE — 87811 SARS-COV-2 COVID19 W/OPTIC: CPT | Mod: QW,S$GLB,, | Performed by: NURSE PRACTITIONER

## 2023-11-14 PROCEDURE — 87811 SARS CORONAVIRUS 2 ANTIGEN POCT, MANUAL READ: ICD-10-PCS | Mod: QW,S$GLB,, | Performed by: NURSE PRACTITIONER

## 2023-11-14 PROCEDURE — 99214 PR OFFICE/OUTPT VISIT, EST, LEVL IV, 30-39 MIN: ICD-10-PCS | Mod: S$GLB,,, | Performed by: NURSE PRACTITIONER

## 2023-11-14 PROCEDURE — 87804 INFLUENZA ASSAY W/OPTIC: CPT | Mod: QW,,, | Performed by: NURSE PRACTITIONER

## 2023-11-14 PROCEDURE — 99214 OFFICE O/P EST MOD 30 MIN: CPT | Mod: S$GLB,,, | Performed by: NURSE PRACTITIONER

## 2023-11-14 RX ORDER — PROMETHAZINE HYDROCHLORIDE AND DEXTROMETHORPHAN HYDROBROMIDE 6.25; 15 MG/5ML; MG/5ML
5 SYRUP ORAL EVERY 4 HOURS PRN
Qty: 180 ML | Refills: 0 | Status: SHIPPED | OUTPATIENT
Start: 2023-11-14 | End: 2023-11-24

## 2023-11-14 RX ORDER — BENZONATATE 200 MG/1
200 CAPSULE ORAL 3 TIMES DAILY PRN
Qty: 30 CAPSULE | Refills: 0 | Status: SHIPPED | OUTPATIENT
Start: 2023-11-14 | End: 2023-11-24

## 2023-11-14 RX ORDER — CETIRIZINE HYDROCHLORIDE 10 MG/1
10 TABLET ORAL DAILY
Qty: 30 TABLET | Refills: 0 | Status: SHIPPED | OUTPATIENT
Start: 2023-11-14 | End: 2023-12-14

## 2023-11-14 NOTE — PATIENT INSTRUCTIONS
Take Tessalon Perles as directed for cough throughout the day.    Take promethazine DM as directed for cough nightly, causes drowsiness.    Utilize over-the-counter Tylenol or Motrin as directed for fever.    Ensure adequate fluid intake with electrolytes.    Thank you for the opportunity to care for you today.  Please take all medications as directed, and continue any previously prescribed medications unless we specifically discussed discontinuing them.  If your symptoms do not resolve or worsen please return to the clinic for re-evaluation.  If your situation becomes emergent, please present to the nearest emergency department.  Follow-up with your PCP for continued evaluation and management.

## 2023-11-14 NOTE — PROGRESS NOTES
"Subjective:      Patient ID: Marizol Corbin is a 23 y.o. female.    Vitals:  height is 5' 5" (1.651 m) and weight is 91.2 kg (201 lb). Her oral temperature is 97.9 °F (36.6 °C). Her blood pressure is 118/75 and her pulse is 48 (abnormal). Her respiration is 20 and oxygen saturation is 98%.     Chief Complaint: Cough    Reports cough is worse at night    Cough  This is a new problem. Episode onset: 3 weeks ago. The problem has been gradually worsening. The cough is Productive of sputum. Pertinent negatives include no chest pain, chills, ear pain, eye redness, fever, headaches, myalgias, postnasal drip, rash, sore throat, shortness of breath or wheezing. Associated symptoms comments: Chest congestion. The symptoms are aggravated by lying down. Treatments tried: Children's cough suppressant, Mucinex. The treatment provided no relief. There is no history of environmental allergies.       Constitution: Negative for activity change, appetite change, chills, fatigue, fever, unexpected weight change and generalized weakness.   HENT:  Negative for ear pain, ear discharge, foreign body in ear, tinnitus, hearing loss, dental problem, mouth sores, tongue pain, facial swelling, congestion, postnasal drip, sinus pain, sinus pressure, sore throat, trouble swallowing and voice change.    Neck: Negative for neck pain, neck stiffness and painful lymph nodes.   Cardiovascular:  Negative for chest pain, leg swelling, palpitations and sob on exertion.   Eyes:  Negative for eye trauma, eye discharge, eye itching, eye pain, eye redness, vision loss and eyelid swelling.   Respiratory:  Positive for cough and sputum production. Negative for chest tightness, COPD, shortness of breath, wheezing and asthma.    Gastrointestinal:  Negative for abdominal pain, nausea, vomiting, constipation, diarrhea, bright red blood in stool and dark colored stools.   Endocrine: hair loss, cold intolerance and heat intolerance.   Genitourinary:  Negative for " dysuria, frequency, urgency and hematuria.   Musculoskeletal:  Negative for pain, trauma, joint pain, joint swelling, abnormal ROM of joint and muscle ache.   Skin:  Negative for color change, pale, rash, wound and hives.   Allergic/Immunologic: Negative for environmental allergies, seasonal allergies, food allergies, asthma, hives and itching.   Neurological:  Negative for dizziness, history of vertigo, light-headedness, facial drooping, speech difficulty, headaches, disorientation, altered mental status, loss of consciousness and numbness.   Hematologic/Lymphatic: Negative for swollen lymph nodes and easy bruising/bleeding. Does not bruise/bleed easily.   Psychiatric/Behavioral:  Negative for altered mental status, disorientation, confusion, agitation, sleep disturbance and hallucinations.       Objective:     Physical Exam   Constitutional: She is oriented to person, place, and time. She appears well-developed. She is cooperative.   HENT:   Head: Normocephalic and atraumatic.   Ears:   Right Ear: Hearing, tympanic membrane, external ear and ear canal normal.   Left Ear: Hearing, tympanic membrane, external ear and ear canal normal.   Nose: Nose normal. No mucosal edema or nasal deformity. No epistaxis. Right sinus exhibits no maxillary sinus tenderness and no frontal sinus tenderness. Left sinus exhibits no maxillary sinus tenderness and no frontal sinus tenderness.   Mouth/Throat: Uvula is midline, oropharynx is clear and moist and mucous membranes are normal. No trismus in the jaw. Normal dentition. No uvula swelling.   Eyes: Conjunctivae and lids are normal.   Neck: Trachea normal and phonation normal. Neck supple.   Cardiovascular: Normal rate, regular rhythm, normal heart sounds and normal pulses.   Pulmonary/Chest: Effort normal and breath sounds normal.   Abdominal: Normal appearance and bowel sounds are normal. Soft.   Musculoskeletal: Normal range of motion.         General: Normal range of motion.    Neurological: She is alert and oriented to person, place, and time. She exhibits normal muscle tone.   Skin: Skin is warm, dry and intact.   Psychiatric: Her speech is normal and behavior is normal. Judgment and thought content normal.   Nursing note and vitals reviewed.      Assessment:     1. Viral URI    2. Cough, unspecified type        Plan:       Viral URI  -     cetirizine (ZYRTEC) 10 MG tablet; Take 1 tablet (10 mg total) by mouth once daily.  Dispense: 30 tablet; Refill: 0    Cough, unspecified type  -     SARS Coronavirus 2 Antigen, POCT Manual Read  -     POCT Influenza A/B Rapid Antigen  -     benzonatate (TESSALON) 200 MG capsule; Take 1 capsule (200 mg total) by mouth 3 (three) times daily as needed for Cough.  Dispense: 30 capsule; Refill: 0  -     promethazine-dextromethorphan (PROMETHAZINE-DM) 6.25-15 mg/5 mL Syrp; Take 5 mLs by mouth every 4 (four) hours as needed (cough).  Dispense: 180 mL; Refill: 0    Utilize over-the-counter Tylenol or Motrin as directed for fever.    Ensure adequate fluid intake with electrolytes.    Return to clinic for new or worsening symptoms.  Patient is recommended to follow-up with their PCP post discharge.    Total time spent on med rec, H&P, with over half of the time in direct patient care: 35 minutes         Additional MDM:     Heart Failure Score:   COPD = No

## 2023-12-23 ENCOUNTER — OFFICE VISIT (OUTPATIENT)
Dept: URGENT CARE | Facility: CLINIC | Age: 23
End: 2023-12-23
Payer: COMMERCIAL

## 2023-12-23 VITALS
BODY MASS INDEX: 32.98 KG/M2 | HEART RATE: 83 BPM | OXYGEN SATURATION: 94 % | TEMPERATURE: 99 F | RESPIRATION RATE: 16 BRPM | WEIGHT: 198.19 LBS | DIASTOLIC BLOOD PRESSURE: 78 MMHG | SYSTOLIC BLOOD PRESSURE: 125 MMHG

## 2023-12-23 DIAGNOSIS — R05.9 COUGH, UNSPECIFIED TYPE: ICD-10-CM

## 2023-12-23 DIAGNOSIS — J20.8 ACUTE VIRAL BRONCHITIS: Primary | ICD-10-CM

## 2023-12-23 DIAGNOSIS — J02.9 SORE THROAT: ICD-10-CM

## 2023-12-23 PROCEDURE — 87811 SARS CORONAVIRUS 2 ANTIGEN POCT, MANUAL READ: ICD-10-PCS | Mod: QW,S$GLB,, | Performed by: NURSE PRACTITIONER

## 2023-12-23 PROCEDURE — 99214 OFFICE O/P EST MOD 30 MIN: CPT | Mod: S$GLB,,, | Performed by: NURSE PRACTITIONER

## 2023-12-23 PROCEDURE — 87804 POCT INFLUENZA A/B: ICD-10-PCS | Mod: QW,,, | Performed by: NURSE PRACTITIONER

## 2023-12-23 PROCEDURE — 87804 INFLUENZA ASSAY W/OPTIC: CPT | Mod: QW,,, | Performed by: NURSE PRACTITIONER

## 2023-12-23 PROCEDURE — 87880 STREP A ASSAY W/OPTIC: CPT | Mod: QW,,, | Performed by: NURSE PRACTITIONER

## 2023-12-23 PROCEDURE — 87811 SARS-COV-2 COVID19 W/OPTIC: CPT | Mod: QW,S$GLB,, | Performed by: NURSE PRACTITIONER

## 2023-12-23 PROCEDURE — 99214 PR OFFICE/OUTPT VISIT, EST, LEVL IV, 30-39 MIN: ICD-10-PCS | Mod: S$GLB,,, | Performed by: NURSE PRACTITIONER

## 2023-12-23 PROCEDURE — 87880 POCT RAPID STREP A: ICD-10-PCS | Mod: QW,,, | Performed by: NURSE PRACTITIONER

## 2023-12-23 RX ORDER — METHYLPREDNISOLONE 4 MG/1
TABLET ORAL
Qty: 21 EACH | Refills: 0 | Status: SHIPPED | OUTPATIENT
Start: 2023-12-23 | End: 2024-01-13

## 2023-12-23 RX ORDER — CETIRIZINE HYDROCHLORIDE 10 MG/1
10 TABLET ORAL DAILY
Qty: 30 TABLET | Refills: 0 | Status: SHIPPED | OUTPATIENT
Start: 2023-12-23

## 2023-12-23 RX ORDER — BENZONATATE 200 MG/1
200 CAPSULE ORAL 3 TIMES DAILY PRN
Qty: 30 CAPSULE | Refills: 0 | Status: SHIPPED | OUTPATIENT
Start: 2023-12-23 | End: 2024-01-02

## 2023-12-23 RX ORDER — ALBUTEROL SULFATE 90 UG/1
2 AEROSOL, METERED RESPIRATORY (INHALATION) EVERY 6 HOURS PRN
Qty: 18 G | Refills: 0 | Status: SHIPPED | OUTPATIENT
Start: 2023-12-23 | End: 2024-12-22

## 2023-12-23 NOTE — PATIENT INSTRUCTIONS
"Bronchitis  If your condition worsens or fails to improve we recommend that you receive another evaluation at the ER immediately or contact your PCP to discuss your concerns or return here. You must understand that you've received an urgent care treatment only and that you may be released before all your medical problems are known or treated. You the patient will arrange for follouwp care as instructed. .  Rest and fluids are important  Can use honey with edilia to soothe your throat  Take inhaler as prescribed and needed for wheezing  Take prescription cough meds (pills) as prescribed; take prescription cough syrup at night as needed for cough.  Do not take both the prescribed cough pills and syrup at the same time.   -  Flonase (fluticasone) is a nasal spray which is available over the counter and may help with your symptoms.   -  Zyrtec D, Claritin D or Allegra D can also help with symptoms of congestion and drainage.   -  If you have hypertension avoid using the "D" which is the decongestant.  Instead you can use Coricidin HBP for cold and cough symptoms.    -  If you just have drainage you can take plain Zyrtec, Claritin or Allegra   -  If you just have a congested feeling you can take pseudoephedrine (unless you have high blood pressure) which you have to sign for behind the counter. Do not buy the phenylephrine which is on the shelf as it is not effective   -  Rest and fluids are also important.   -  Tylenol or ibuprofen can also be used as directed for pain unless you have an allergy to them or medical condition such as stomach ulcers, kidney or liver disease or blood thinners etc for which you should not be taking these type of medications.   Please follow up with your primary care doctor or specialist in the next 48-72hrs as needed and if no improvement  If you  smoke, please stop smoking.                                                               "

## 2023-12-23 NOTE — PROGRESS NOTES
Subjective:      Patient ID: Marizol Corbin is a 23 y.o. female.    Vitals:  weight is 89.9 kg (198 lb 3.2 oz). Her oral temperature is 99.3 °F (37.4 °C). Her blood pressure is 125/78 and her pulse is 83. Her respiration is 16 and oxygen saturation is 94% (abnormal).     Chief Complaint: Sore Throat (Pt also c/o of chest pain from coughing. Would like to be tested for covid and flu/)    Sore Throat   This is a new problem. The current episode started yesterday. The problem has been unchanged. The patient is experiencing no pain. Associated symptoms include congestion, coughing and headaches. The treatment provided no relief.       HENT:  Positive for congestion and sore throat.    Respiratory:  Positive for cough.    Neurological:  Positive for headaches.      Objective:     Physical Exam   Constitutional: She is oriented to person, place, and time.  Non-toxic appearance. She does not appear ill. No distress.   HENT:   Head: Normocephalic and atraumatic.   Ears:   Right Ear: Hearing, tympanic membrane, external ear and ear canal normal.   Left Ear: Hearing, tympanic membrane, external ear and ear canal normal.   Nose: Nose normal. Right sinus exhibits no maxillary sinus tenderness and no frontal sinus tenderness. Left sinus exhibits no maxillary sinus tenderness and no frontal sinus tenderness.   Mouth/Throat: Uvula is midline, oropharynx is clear and moist and mucous membranes are normal. Mucous membranes are moist. Oropharynx is clear.   Eyes: Extraocular movement intact   Pulmonary/Chest: Effort normal and breath sounds normal.         Comments: No acute respiratory distress. Able to speak in full sentences without difficulty or pause    Abdominal: Normal appearance.   Neurological: She is alert and oriented to person, place, and time.   Skin: Skin is not diaphoretic. Capillary refill takes less than 2 seconds.   Nursing note and vitals reviewed.        Results for orders placed or performed in visit on  12/23/23   SARS Coronavirus 2 Antigen, POCT Manual Read   Result Value Ref Range    SARS Coronavirus 2 Antigen Negative Negative     Acceptable Yes    POCT Influenza A/B Rapid Antigen   Result Value Ref Range    Rapid Influenza A Ag Negative Negative    Rapid Influenza B Ag Negative Negative     Acceptable Yes    POCT rapid strep A   Result Value Ref Range    Rapid Strep A Screen Negative Negative, Positive Slide, Positive Tube     Acceptable Yes       Assessment:     1. Acute viral bronchitis    2. Sore throat        Plan:   Covid negative  Influenza A& B negative  Strep A negative  Requesting Medrol Dose [place]. It has helped in the past.    Acute viral bronchitis  -     methylPREDNISolone (MEDROL DOSEPACK) 4 mg tablet; use as directed  Dispense: 21 each; Refill: 0  -     cetirizine (ZYRTEC) 10 MG tablet; Take 1 tablet (10 mg total) by mouth once daily.  Dispense: 30 tablet; Refill: 0  -     benzonatate (TESSALON) 200 MG capsule; Take 1 capsule (200 mg total) by mouth 3 (three) times daily as needed for Cough.  Dispense: 30 capsule; Refill: 0  -     albuterol (PROVENTIL HFA) 90 mcg/actuation inhaler; Inhale 2 puffs into the lungs every 6 (six) hours as needed for Wheezing. Rescue  Dispense: 18 g; Refill: 0    Sore throat  -     SARS Coronavirus 2 Antigen, POCT Manual Read  -     POCT Influenza A/B Rapid Antigen  -     POCT rapid strep A                Patient Instructions   Bronchitis  If your condition worsens or fails to improve we recommend that you receive another evaluation at the ER immediately or contact your PCP to discuss your concerns or return here. You must understand that you've received an urgent care treatment only and that you may be released before all your medical problems are known or treated. You the patient will arrange for follouwp care as instructed. .  Rest and fluids are important  Can use honey with edilia to soothe your throat  Take inhaler as  "prescribed and needed for wheezing  Take prescription cough meds (pills) as prescribed; take prescription cough syrup at night as needed for cough.  Do not take both the prescribed cough pills and syrup at the same time.   -  Flonase (fluticasone) is a nasal spray which is available over the counter and may help with your symptoms.   -  Zyrtec D, Claritin D or Allegra D can also help with symptoms of congestion and drainage.   -  If you have hypertension avoid using the "D" which is the decongestant.  Instead you can use Coricidin HBP for cold and cough symptoms.    -  If you just have drainage you can take plain Zyrtec, Claritin or Allegra   -  If you just have a congested feeling you can take pseudoephedrine (unless you have high blood pressure) which you have to sign for behind the counter. Do not buy the phenylephrine which is on the shelf as it is not effective   -  Rest and fluids are also important.   -  Tylenol or ibuprofen can also be used as directed for pain unless you have an allergy to them or medical condition such as stomach ulcers, kidney or liver disease or blood thinners etc for which you should not be taking these type of medications.   Please follow up with your primary care doctor or specialist in the next 48-72hrs as needed and if no improvement  If you  smoke, please stop smoking.                                                                    "

## 2024-06-23 ENCOUNTER — OFFICE VISIT (OUTPATIENT)
Dept: URGENT CARE | Facility: CLINIC | Age: 24
End: 2024-06-23
Payer: COMMERCIAL

## 2024-06-23 VITALS
BODY MASS INDEX: 32.99 KG/M2 | OXYGEN SATURATION: 99 % | WEIGHT: 198 LBS | SYSTOLIC BLOOD PRESSURE: 110 MMHG | HEART RATE: 50 BPM | RESPIRATION RATE: 16 BRPM | TEMPERATURE: 97 F | DIASTOLIC BLOOD PRESSURE: 80 MMHG | HEIGHT: 65 IN

## 2024-06-23 DIAGNOSIS — R51.9 NONINTRACTABLE HEADACHE, UNSPECIFIED CHRONICITY PATTERN, UNSPECIFIED HEADACHE TYPE: ICD-10-CM

## 2024-06-23 DIAGNOSIS — K52.9 GASTROENTERITIS: Primary | ICD-10-CM

## 2024-06-23 DIAGNOSIS — R89.4 INFLUENZA A VIRUS NOT DETECTED: ICD-10-CM

## 2024-06-23 DIAGNOSIS — Z20.822 COVID-19 VIRUS NOT DETECTED: ICD-10-CM

## 2024-06-23 DIAGNOSIS — R11.10 VOMITING, UNSPECIFIED VOMITING TYPE, UNSPECIFIED WHETHER NAUSEA PRESENT: ICD-10-CM

## 2024-06-23 PROCEDURE — 87811 SARS-COV-2 COVID19 W/OPTIC: CPT | Mod: QW,S$GLB,,

## 2024-06-23 PROCEDURE — 99214 OFFICE O/P EST MOD 30 MIN: CPT | Mod: S$GLB,,,

## 2024-06-23 PROCEDURE — 87804 INFLUENZA ASSAY W/OPTIC: CPT | Mod: QW,,,

## 2024-06-23 RX ORDER — DICYCLOMINE HYDROCHLORIDE 10 MG/1
10 CAPSULE ORAL
Qty: 40 CAPSULE | Refills: 0 | Status: SHIPPED | OUTPATIENT
Start: 2024-06-23 | End: 2024-07-03

## 2024-06-23 RX ORDER — BUTALBITAL, ACETAMINOPHEN AND CAFFEINE 50; 325; 40 MG/1; MG/1; MG/1
1 TABLET ORAL EVERY 4 HOURS PRN
Qty: 30 TABLET | Refills: 0 | Status: SHIPPED | OUTPATIENT
Start: 2024-06-23 | End: 2024-07-03

## 2024-06-23 RX ORDER — ONDANSETRON 4 MG/1
4 TABLET, ORALLY DISINTEGRATING ORAL EVERY 6 HOURS PRN
Qty: 20 TABLET | Refills: 0 | Status: SHIPPED | OUTPATIENT
Start: 2024-06-23

## 2024-06-23 NOTE — PROGRESS NOTES
" Subjective:      Patient ID: Marizol Corbin is a 23 y.o. female.    Vitals:  height is 5' 5" (1.651 m) and weight is 89.8 kg (198 lb). Her temperature is 97.1 °F (36.2 °C). Her blood pressure is 110/80 and her pulse is 50 (abnormal). Her respiration is 16 and oxygen saturation is 99%.     Chief Complaint: Nausea    In clinic with a chief complaint of nausea, vomiting, diarrhea, and stomach cramping since Friday.  No fevers.  No episodes of vomiting, or diarrhea in the past 48 hours.  Symptoms are gradually improving.  Patient reports she needs a COVID and influenza test in order return to work.  She has been taking Gas-X which significantly improved symptoms, and Pepto.    Nausea  This is a new problem. The current episode started in the past 7 days (x 3 days). The problem has been gradually worsening. Associated symptoms include abdominal pain, headaches, nausea, vomiting and weakness. Pertinent negatives include no change in bowel habit, congestion, fever, neck pain, numbness, sore throat or urinary symptoms. Treatments tried: pepto, gas x, pepcid.       Constitution: Negative for fever.   HENT:  Negative for congestion and sore throat.    Neck: Negative for neck pain.   Cardiovascular: Negative.    Eyes: Negative.    Respiratory: Negative.     Gastrointestinal:  Positive for abdominal pain, nausea, vomiting and diarrhea.   Endocrine: negative.   Genitourinary: Negative.    Musculoskeletal: Negative.    Skin: Negative.    Neurological:  Positive for headaches. Negative for numbness.   Hematologic/Lymphatic: Negative.  Negative for history of bleeding disorder.   Psychiatric/Behavioral: Negative.        Objective:     Physical Exam   Constitutional: She is oriented to person, place, and time. She is cooperative.   HENT:   Head: Normocephalic and atraumatic.   Ears:   Right Ear: External ear normal.   Left Ear: External ear normal.   Nose: Nose normal.   Mouth/Throat: Uvula is midline, oropharynx is clear and " moist and mucous membranes are normal. Mucous membranes are moist. Oropharynx is clear.   Eyes: Conjunctivae and lids are normal. Pupils are equal, round, and reactive to light. Extraocular movement intact   Neck: Trachea normal and phonation normal. Neck supple.   Cardiovascular: Normal rate, regular rhythm, normal heart sounds and normal pulses.   Pulmonary/Chest: Effort normal and breath sounds normal.   Abdominal: Normal appearance. Soft. flat abdomen There is abdominal tenderness. There is no rebound, no guarding, no tenderness at McBurney's point, negative Hsu's sign, no left CVA tenderness and no right CVA tenderness.      Comments: Diffuse abdominal tenderness.  No guarding.  No round rebound tenderness   Neurological: no focal deficit. She is alert, oriented to person, place, and time and at baseline. She has normal motor skills, normal sensation and intact cranial nerves (2-12). Gait and coordination normal. GCS eye subscore is 4. GCS verbal subscore is 5. GCS motor subscore is 6.   Skin: Skin is warm and dry. Capillary refill takes 2 to 3 seconds.   Psychiatric: She experiences Normal attention and Normal perception. Her speech is normal and behavior is normal. Mood, judgment and thought content normal.   Nursing note and vitals reviewed.      Assessment:     1. Gastroenteritis    2. Vomiting, unspecified vomiting type, unspecified whether nausea present    3. Nonintractable headache, unspecified chronicity pattern, unspecified headache type    4. Influenza A virus not detected    5. COVID-19 virus not detected        Plan:       Gastroenteritis  -     dicyclomine (BENTYL) 10 MG capsule; Take 1 capsule (10 mg total) by mouth before meals and at bedtime as needed (Stomach cramps).  Dispense: 40 capsule; Refill: 0  -     ondansetron (ZOFRAN-ODT) 4 MG TbDL; Take 1 tablet (4 mg total) by mouth every 6 (six) hours as needed (nausea).  Dispense: 20 tablet; Refill: 0    Vomiting, unspecified vomiting type,  unspecified whether nausea present  -     SARS Coronavirus 2 Antigen, POCT Manual Read  -     POCT Influenza A/B Rapid Antigen    Nonintractable headache, unspecified chronicity pattern, unspecified headache type  -     butalbital-acetaminophen-caffeine -40 mg (FIORICET, ESGIC) -40 mg per tablet; Take 1 tablet by mouth every 4 (four) hours as needed for Pain or Headaches.  Dispense: 30 tablet; Refill: 0    Influenza A virus not detected    COVID-19 virus not detected      Three days of abdominal cramping, nausea, vomiting, diarrhea.  Symptoms are gradually improving, and patient is tolerating p.o..  She is still complaining of the headache.  No focal neuro deficits.  No history of migraines.  Supportive treatments.

## 2024-06-23 NOTE — PATIENT INSTRUCTIONS
Take odt zofran as needed for vomiting  Clear liquid diet today.  You may have electrolyte drinks.  In 12 hours, start a BRAT diet (Banana, rice, apple sauce, toast), and advance your diet as necessary until feeling better.    If you are unable to keep fluids down or your condition worsens seek further care at an Emergency Dept.     ER precautions for worsening stomach cramping/pain

## 2024-06-23 NOTE — LETTER
June 23, 2024      [facility]  [address]  [facility] [address]  Phone: [phone]       Patient: Marizol Corbin   YOB: 2000  Date of Visit: 06/23/2024    To Whom It May Concern:    LUNA Corbin  was at [facility] on 06/23/2024.  She was tested for COVID, and influenza and both were negative The patient may return to work/school on 6/24/24 with no restrictions. If you have any questions or concerns, or if I can be of further assistance, please do not hesitate to contact me.    Sincerely,    SYLVIA Gutierrez

## 2024-12-18 ENCOUNTER — OFFICE VISIT (OUTPATIENT)
Dept: URGENT CARE | Facility: CLINIC | Age: 24
End: 2024-12-18
Payer: OTHER MISCELLANEOUS

## 2024-12-18 VITALS
TEMPERATURE: 98 F | OXYGEN SATURATION: 100 % | HEART RATE: 73 BPM | RESPIRATION RATE: 16 BRPM | SYSTOLIC BLOOD PRESSURE: 128 MMHG | DIASTOLIC BLOOD PRESSURE: 80 MMHG

## 2024-12-18 DIAGNOSIS — Z02.6 ENCOUNTER RELATED TO WORKER'S COMPENSATION CLAIM: Primary | ICD-10-CM

## 2024-12-18 DIAGNOSIS — S60.221A CONTUSION OF DORSUM OF RIGHT HAND: ICD-10-CM

## 2024-12-18 PROCEDURE — 73130 X-RAY EXAM OF HAND: CPT | Mod: RT,S$GLB,, | Performed by: RADIOLOGY

## 2024-12-18 RX ORDER — MELOXICAM 7.5 MG/1
7.5 TABLET ORAL DAILY
Qty: 30 TABLET | Refills: 1 | Status: SHIPPED | OUTPATIENT
Start: 2024-12-18

## 2024-12-18 RX ORDER — DICLOFENAC SODIUM 10 MG/G
2 GEL TOPICAL 4 TIMES DAILY
Qty: 100 G | Refills: 1 | Status: SHIPPED | OUTPATIENT
Start: 2024-12-18

## 2024-12-18 NOTE — PROGRESS NOTES
Subjective:      Patient ID: Marizol Corbin is a 24 y.o. female.    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  Put ice on it and given tylenol  Pain scale right now - 4/10    Hand Injury   Her dominant hand is their right hand. The incident occurred 1 to 3 hours ago. The incident occurred at work. The injury mechanism was a direct blow. The pain is present in the right hand. The quality of the pain is described as aching. The pain does not radiate. The pain is at a severity of 4/10. The pain is mild. The pain has been Fluctuating since the incident. The symptoms are aggravated by movement. She has tried ice and acetaminophen for the symptoms. The treatment provided moderate relief.     ROS  Objective:     Physical Exam  Musculoskeletal:         General: Swelling and tenderness present.        Hands:         Assessment:      1. Encounter related to worker's compensation claim    2. Contusion of dorsum of right hand      Plan:       Medications Ordered This Encounter   Medications    diclofenac sodium (VOLTAREN) 1 % Gel     Sig: Apply 2 g topically 4 (four) times daily.     Dispense:  100 g     Refill:  1    meloxicam (MOBIC) 7.5 MG tablet     Sig: Take 1 tablet (7.5 mg total) by mouth once daily.     Dispense:  30 tablet     Refill:  1     Patient Instructions: Daily home exercises/warm soaks, Attention not to aggravate affected area, Apply ice 24-48 hours then apply heat/warm soaks   Restrictions: No lifting/pushing/pulling more than 10 lbs (limited use of right hand- may advance to regular duty as tolerated.)  No follow-ups on file.      XR  HAND COMPLETE 3 VIEW RIGHT    Result Date: 12/18/2024  EXAMINATION: XR HAND COMPLETE 3 VIEW RIGHT CLINICAL HISTORY: Encounter for examination for insurance purposes TECHNIQUE: PA, lateral, and oblique views of the right hand were performed. COMPARISON: None FINDINGS: A fracture of the bones of the wrist or hand is not seen.  Soft tissue swelling or foreign bodies are not noted.  Juxta-articular bone erosion or Osteoporosis is not seen.  Subluxation or dislocation of the carpals is not demonstrated.     Negative radiographs of the right hand. Electronically signed by: Jus Bullock MD Date:    12/18/2024 Time:    10:44

## 2024-12-18 NOTE — LETTER
[facility] [place] [address], [address]  [facility] [address]  Phone: [phone]  Fax: [phone]  [facility]: [id number]    Pt Name: Marizol MILLAN Cedotal  Injury Date: 12/18/2024   Employee ID: [id number] Date of First Treatment: 12/18/2024   Company: [employer]      Appointment Time: 09:40 AM Arrived: 955   Provider: Chidi Sofia MD Time Out:1100     Office Treatment:   1. Encounter related to worker's compensation claim    2. Contusion of dorsum of right hand      Medications Ordered This Encounter   Medications    diclofenac sodium (VOLTAREN) 1 % Gel    meloxicam (MOBIC) 7.5 MG tablet      Patient Instructions: Daily home exercises/warm soaks, Attention not to aggravate affected area, Apply ice 24-48 hours then apply heat/warm soaks    Restrictions: No lifting/pushing/pulling more than 10 lbs (limited use of right hand- may advance to regular duty as tolerated.)     Return Appointment:12/27 virtually     You have a work related injury. Medical care and treatment required as a result of a work-related injury  should be focused on restoring functional ability required to meet your daily and work activities and return to work, while striving to restore your health to pre-injury status in so far as is feasible.  If Rx a medication that can be sedating, DO NOT TAKE DURING YOUR WORK DAY.    Some OTC measures to help in recovery(if no allergies to, renal issues or pregnant):  Tylenol 325mg 3x per day  Ibuprofen 400mg 3x per day OR Aleve regular strength one tablet 2x per day  Take Pepcid 20mg BID  If applicable or discussed: Magnesium OTC daily; Topical Voltaren Gel; Lidocaine patches, neoprene OTC compression sleeve  Massage area if possible  Resting of the injured area  Ice for ankle, wrist or elbow injury  Elevation of the injured area if applicable  Heating pad for muscle injury  Stretching/ROM exercises as  described in clinic.   ** BE ADVISED: You should be in regular contact with your W/C  to know the status of your claim and /or (if any)pending referrals**

## 2024-12-27 ENCOUNTER — OFFICE VISIT (OUTPATIENT)
Dept: URGENT CARE | Facility: CLINIC | Age: 24
End: 2024-12-27
Payer: OTHER MISCELLANEOUS

## 2024-12-27 DIAGNOSIS — S60.221A CONTUSION OF DORSUM OF RIGHT HAND: Primary | ICD-10-CM

## 2024-12-27 NOTE — PROGRESS NOTES
HPI:  Return virtual  visit- isolated pain with touching hand but overall improved and working regular duty without issue    PE:   FROM hand and digits    Contusion of dorsum of right hand      Released to full duty.

## 2024-12-27 NOTE — LETTER
[facility] [place] [address], [address]  [facility] [address]  Phone: [phone]  Fax: [phone]  [facility]: [id number]    Pt Name: Marizol MILLAN Cedotal  Injury Date: 12/18/2024   Employee ID: [id number] Date of First Treatment: 12/27/2024   Company: [employer]      Appointment Time: 10:45 AM Arrived: 1100   Provider: Chidi Sofia MD Time Out:1115     Office Treatment:   1. Contusion of dorsum of right hand               Restrictions: Regular Duty, Discharged from Occupational Health

## 2025-05-08 ENCOUNTER — HOSPITAL ENCOUNTER (EMERGENCY)
Facility: HOSPITAL | Age: 25
Discharge: HOME | End: 2025-05-08
Attending: EMERGENCY MEDICINE
Payer: COMMERCIAL

## 2025-05-08 VITALS
WEIGHT: 200 LBS | HEART RATE: 109 BPM | OXYGEN SATURATION: 96 % | HEIGHT: 65 IN | BODY MASS INDEX: 33.32 KG/M2 | DIASTOLIC BLOOD PRESSURE: 78 MMHG | TEMPERATURE: 99 F | RESPIRATION RATE: 18 BRPM | SYSTOLIC BLOOD PRESSURE: 116 MMHG

## 2025-05-08 DIAGNOSIS — R05.8 PRODUCTIVE COUGH: ICD-10-CM

## 2025-05-08 DIAGNOSIS — R00.0 TACHYCARDIA: ICD-10-CM

## 2025-05-08 DIAGNOSIS — B34.9 VIRAL SYNDROME: Primary | ICD-10-CM

## 2025-05-08 LAB
ABSOLUTE EOSINOPHIL (SMH): 0.23 K/UL
ABSOLUTE MONOCYTE (SMH): 1.37 K/UL (ref 0.3–1)
ABSOLUTE NEUTROPHIL COUNT (SMH): 7.6 K/UL (ref 1.8–7.7)
ADENOVIRUS (SMH): NOT DETECTED
ALBUMIN SERPL-MCNC: 4.5 G/DL (ref 3.5–5.2)
ALP SERPL-CCNC: 70 UNIT/L (ref 55–135)
ALT SERPL-CCNC: 28 UNIT/L (ref 10–44)
ANION GAP (SMH): 10 MMOL/L (ref 8–16)
AST SERPL-CCNC: 22 UNIT/L (ref 10–40)
B-HCG UR QL: NEGATIVE
BASOPHILS # BLD AUTO: 0.07 K/UL
BASOPHILS NFR BLD AUTO: 0.6 %
BILIRUB SERPL-MCNC: 0.3 MG/DL (ref 0.1–1)
BORDETELLA PARAPERTUSSIS (IS1001) (SMH): NOT DETECTED
BORDETELLA PERTUSSIS (PTXP) (SMH): NOT DETECTED
BUN SERPL-MCNC: 9 MG/DL (ref 6–20)
CALCIUM SERPL-MCNC: 9.7 MG/DL (ref 8.7–10.5)
CHLAMYDIA PNEUMONIAE (SMH): NOT DETECTED
CHLORIDE SERPL-SCNC: 104 MMOL/L (ref 95–110)
CO2 SERPL-SCNC: 23 MMOL/L (ref 23–29)
CORONAVIRUS 229E, COMMON COLD VIRUS (SMH): NOT DETECTED
CORONAVIRUS HKU1, COMMON COLD VIRUS (SMH): NOT DETECTED
CORONAVIRUS NL63, COMMON COLD VIRUS (SMH): NOT DETECTED
CORONAVIRUS OC43, COMMON COLD VIRUS (SMH): NOT DETECTED
CREAT SERPL-MCNC: 0.7 MG/DL (ref 0.5–1.4)
CTP QC/QA: YES
D DIMER PPP IA.FEU-MCNC: 0.4 MG/L FEU
ERYTHROCYTE [DISTWIDTH] IN BLOOD BY AUTOMATED COUNT: 12.9 % (ref 11.5–14.5)
FLUBV RNA NPH QL NAA+NON-PROBE: NOT DETECTED
GFR SERPLBLD CREATININE-BSD FMLA CKD-EPI: >60 ML/MIN/1.73/M2
GLUCOSE SERPL-MCNC: 105 MG/DL (ref 70–110)
HCT VFR BLD AUTO: 42.5 % (ref 37–48.5)
HGB BLD-MCNC: 13.9 GM/DL (ref 12–16)
HPIV1 RNA NPH QL NAA+NON-PROBE: NOT DETECTED
HPIV2 RNA NPH QL NAA+NON-PROBE: NOT DETECTED
HPIV3 RNA NPH QL NAA+NON-PROBE: NOT DETECTED
HPIV4 RNA NPH QL NAA+NON-PROBE: NOT DETECTED
HUMAN METAPNEUMOVIRUS (SMH): DETECTED
IMM GRANULOCYTES # BLD AUTO: 0.17 K/UL (ref 0–0.04)
IMM GRANULOCYTES NFR BLD AUTO: 1.4 % (ref 0–0.5)
INFLUENZA A (SUBTYPES H1,H1-2009,H3) (SMH): NOT DETECTED
LYMPHOCYTES # BLD AUTO: 2.71 K/UL (ref 1–4.8)
MCH RBC QN AUTO: 27.4 PG (ref 27–31)
MCHC RBC AUTO-ENTMCNC: 32.7 G/DL (ref 32–36)
MCV RBC AUTO: 84 FL (ref 82–98)
MYCOPLASMA PNEUMONIAE (SMH): NOT DETECTED
NUCLEATED RBC (/100WBC) (SMH): 0 /100 WBC
PLATELET # BLD AUTO: 260 K/UL (ref 150–450)
PMV BLD AUTO: 9.9 FL (ref 9.2–12.9)
POTASSIUM SERPL-SCNC: 3.3 MMOL/L (ref 3.5–5.1)
PROT SERPL-MCNC: 8.6 GM/DL (ref 6–8.4)
RBC # BLD AUTO: 5.07 M/UL (ref 4–5.4)
RELATIVE EOSINOPHIL (SMH): 1.9 % (ref 0–8)
RELATIVE LYMPHOCYTE (SMH): 22.4 % (ref 18–48)
RELATIVE MONOCYTE (SMH): 11.3 % (ref 4–15)
RELATIVE NEUTROPHIL (SMH): 62.4 % (ref 38–73)
RESPIRATORY INFECTION PANEL SOURCE (SMH): ABNORMAL
RSV RNA NPH QL NAA+NON-PROBE: NOT DETECTED
RV+EV RNA NPH QL NAA+NON-PROBE: NOT DETECTED
SARS-COV-2 RNA RESP QL NAA+PROBE: NOT DETECTED
SODIUM SERPL-SCNC: 137 MMOL/L (ref 136–145)
WBC # BLD AUTO: 12.1 K/UL (ref 3.9–12.7)

## 2025-05-08 PROCEDURE — 80053 COMPREHEN METABOLIC PANEL: CPT | Performed by: PHYSICIAN ASSISTANT

## 2025-05-08 PROCEDURE — 93010 ELECTROCARDIOGRAM REPORT: CPT | Mod: ,,, | Performed by: INTERNAL MEDICINE

## 2025-05-08 PROCEDURE — 93005 ELECTROCARDIOGRAM TRACING: CPT | Performed by: INTERNAL MEDICINE

## 2025-05-08 PROCEDURE — 81025 URINE PREGNANCY TEST: CPT | Performed by: PHYSICIAN ASSISTANT

## 2025-05-08 PROCEDURE — 85025 COMPLETE CBC W/AUTO DIFF WBC: CPT | Performed by: PHYSICIAN ASSISTANT

## 2025-05-08 PROCEDURE — 99285 EMERGENCY DEPT VISIT HI MDM: CPT | Mod: 25

## 2025-05-08 PROCEDURE — 86803 HEPATITIS C AB TEST: CPT | Performed by: EMERGENCY MEDICINE

## 2025-05-08 PROCEDURE — 85379 FIBRIN DEGRADATION QUANT: CPT | Performed by: EMERGENCY MEDICINE

## 2025-05-08 PROCEDURE — 0202U NFCT DS 22 TRGT SARS-COV-2: CPT | Performed by: EMERGENCY MEDICINE

## 2025-05-08 PROCEDURE — 87389 HIV-1 AG W/HIV-1&-2 AB AG IA: CPT | Performed by: EMERGENCY MEDICINE

## 2025-05-08 RX ORDER — BENZONATATE 100 MG/1
200 CAPSULE ORAL 3 TIMES DAILY PRN
Qty: 15 CAPSULE | Refills: 0 | Status: SHIPPED | OUTPATIENT
Start: 2025-05-08 | End: 2025-05-13

## 2025-05-08 NOTE — Clinical Note
"Marizol Ambrosiogiovanna Corbin was seen and treated in our emergency department on 5/8/2025.  She may return to work on 05/12/2025.       If you have any questions or concerns, please don't hesitate to call.       RN    "

## 2025-05-09 LAB
HCV AB SERPL QL IA: NORMAL
HIV 1+2 AB+HIV1 P24 AG SERPL QL IA: NORMAL
OHS QRS DURATION: 72 MS
OHS QTC CALCULATION: 403 MS

## 2025-05-09 NOTE — FIRST PROVIDER EVALUATION
Emergency Department TeleTriage Encounter Note      CHIEF COMPLAINT    Chief Complaint   Patient presents with    Fatigue     Pt is being treated for suspected pna, pt started levoquin and is not feeling well. States she feels weak.        VITAL SIGNS   Initial Vitals [05/08/25 1732]   BP Pulse Resp Temp SpO2   138/82 (!) 114 18 98.2 °F (36.8 °C) 100 %      MAP       --            ALLERGIES    Review of patient's allergies indicates:   Allergen Reactions    Augmentin [amoxicillin-pot clavulanate]     Oxycodone Itching       PROVIDER TRIAGE NOTE  This is a teletriage evaluation of a 24 y.o. female presenting to the ED complaining of fatigue. Patient started levaquin for pneumonia this morning. She has felt more short of breath since then. She has intermittent fevers.    Patient is alert and oriented. She speaks in complete sentences. She is sitting upright in the chair in no distress.     Initial orders will be placed and care will be transferred to an alternate provider when patient is roomed for a full evaluation. Any additional orders and the final disposition will be determined by that provider.         ORDERS  Labs Reviewed   HEPATITIS C ANTIBODY   HIV 1 / 2 ANTIBODY   COMPREHENSIVE METABOLIC PANEL   CBC W/ AUTO DIFFERENTIAL    Narrative:     The following orders were created for panel order CBC auto differential.  Procedure                               Abnormality         Status                     ---------                               -----------         ------                     CBC with Differential[[id number]]                                                        Please view results for these tests on the individual orders.   CBC WITH DIFFERENTIAL   POCT URINE PREGNANCY       ED Orders (720h ago, onward)      Start Ordered     Status Ordering Provider    05/08/25 1944 05/08/25 1944  Comprehensive metabolic panel  STAT         Ordered MAX SAHA    05/08/25 1944 05/08/25 1944  CBC auto  differential  STAT         Ordered MAX SAHA.    05/08/25 1944 05/08/25 1944  Insert Saline lock IV  Once         Ordered YIFANMAX.    05/08/25 1944 05/08/25 1944  EKG 12-lead  Once         Ordered MAX SAHA.    05/08/25 1944 05/08/25 1944  POCT urine pregnancy  Once         Ordered YIFANMAX YANEZ G.    05/08/25 1944 05/08/25 1944  X-Ray Chest PA And Lateral  1 time imaging         Ordered MAX SAHA.    05/08/25 1944 05/08/25 1945  CBC with Differential  PROCEDURE ONCE         Ordered MAX SAHA.    05/08/25 1734 05/08/25 1733  Hepatitis C Antibody  STAT         Ordered AMANDA WEBSTER    05/08/25 1734 05/08/25 1733  HIV 1/2 Ag/Ab (4th Gen)  STAT         Ordered AMANDA WEBSTER              Virtual Visit Note: The provider triage portion of this emergency department evaluation and documentation was performed via [employer], a HIPAA-compliant telemedicine application, in concert with a tele-presenter in the room. A face to face patient evaluation with one of my colleagues will occur once the patient is placed in an emergency department room.      DISCLAIMER: This note was prepared with [employer] voice recognition transcription software. Garbled syntax, mangled pronouns, and other bizarre constructions may be attributed to that software system.

## 2025-05-09 NOTE — DISCHARGE INSTRUCTIONS
Finish your antibiotics.  Take vitamins zinc rest encourage fluids Tylenol and Motrin for aches Tessalon Perles for cough.  Return if any worsening symptoms

## 2025-06-11 ENCOUNTER — PATIENT MESSAGE (OUTPATIENT)
Dept: CARDIOLOGY | Facility: CLINIC | Age: 25
End: 2025-06-11
Payer: COMMERCIAL

## 2025-06-12 ENCOUNTER — OFFICE VISIT (OUTPATIENT)
Dept: CARDIOLOGY | Facility: CLINIC | Age: 25
End: 2025-06-12
Payer: COMMERCIAL

## 2025-06-12 VITALS
SYSTOLIC BLOOD PRESSURE: 110 MMHG | OXYGEN SATURATION: 99 % | HEIGHT: 65 IN | DIASTOLIC BLOOD PRESSURE: 60 MMHG | HEART RATE: 84 BPM | BODY MASS INDEX: 34.1 KG/M2 | WEIGHT: 204.69 LBS

## 2025-06-12 DIAGNOSIS — I49.3 PVC (PREMATURE VENTRICULAR CONTRACTION): Primary | ICD-10-CM

## 2025-06-12 DIAGNOSIS — R94.31 NONSPECIFIC ABNORMAL ELECTROCARDIOGRAM (ECG) (EKG): ICD-10-CM

## 2025-06-12 DIAGNOSIS — R00.0 TACHYCARDIA: ICD-10-CM

## 2025-06-12 PROCEDURE — 3008F BODY MASS INDEX DOCD: CPT | Mod: CPTII,S$GLB,, | Performed by: INTERNAL MEDICINE

## 2025-06-12 PROCEDURE — 3078F DIAST BP <80 MM HG: CPT | Mod: CPTII,S$GLB,, | Performed by: INTERNAL MEDICINE

## 2025-06-12 PROCEDURE — 99203 OFFICE O/P NEW LOW 30 MIN: CPT | Mod: S$GLB,,, | Performed by: INTERNAL MEDICINE

## 2025-06-12 PROCEDURE — 3074F SYST BP LT 130 MM HG: CPT | Mod: CPTII,S$GLB,, | Performed by: INTERNAL MEDICINE

## 2025-06-12 PROCEDURE — 1159F MED LIST DOCD IN RCRD: CPT | Mod: CPTII,S$GLB,, | Performed by: INTERNAL MEDICINE

## 2025-06-12 PROCEDURE — 99999 PR PBB SHADOW E&M-EST. PATIENT-LVL IV: CPT | Mod: PBBFAC,,, | Performed by: INTERNAL MEDICINE

## 2025-06-12 PROCEDURE — 1160F RVW MEDS BY RX/DR IN RCRD: CPT | Mod: CPTII,S$GLB,, | Performed by: INTERNAL MEDICINE

## 2025-06-12 RX ORDER — MULTIVITAMIN
1 TABLET ORAL DAILY
COMMUNITY

## 2025-06-12 NOTE — PROGRESS NOTES
Patient ID:  Marizol Corbin is a 24 y.o. female who presents with Abnormal ECG, Tachycardia, Shortness of Breath, Chest Pain (On & off), and Palpitations      History of Present Illness    CHIEF COMPLAINT:  Patient presents today for follow up of abnormal EKG results from recent hospital visit.    RECENT RESPIRATORY INFECTION:  She recently experienced a metapneumovirus infection, initially suspected to be pneumonia, with symptoms including bands around her neck, throat, and chest causing difficulty breathing. Treatment progression included initial steroids and cough syrup, followed by steroid pills and additional cough medication at urgent care, and finally a Rocephin shot with Levaquin prescription. She developed diarrhea within hours of receiving Rocephin and experienced an allergic reaction after 1 dose of Levaquin.    CARDIAC HISTORY AND SYMPTOMS:  She was diagnosed with arrhythmia in 2016 and currently experiences palpitations described as skipping heartbeat sensation with associated nausea and weakness. During these episodes, she notes irregular pulse. She reports LLE chest pain while walking at work. She experienced 3-4 syncopal episodes in [id number] with no recurrence since then. Previous healthcare providers have attributed symptoms to anxiety, which has been a source of frustration.    FAMILY CARDIAC HISTORY:  She has significant cardiac history on both sides of her family. Her mother has a second naturally occurring pacemaker. Multiple paternal relatives have experienced cardiac events including MIs and cardiac disease. Her father had his first MI in his mid-40s, with a total of three MIs and subsequent CABG.    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 She reports nausea and weakness while working in hot conditions wearing [employer] protective gear, which improves with breaks and hydration.    CURRENT MEDICATIONS:  She takes  "Claritin as needed, Centrum multivitamin and zinc supplement with vitamin C daily.      ROS:  General: +weakness  Cardiovascular: +chest pain, +palpitations, +feeling of skipped beats, +feelings of fast heart rate  Respiratory: +cough, +difficulty breathing, +exertional dyspnea  Gastrointestinal: +nausea, +diarrhea  Neurological: +dizziness  Allergic: +allergic reactions           Past Medical History:   Diagnosis Date    Anxiety     Arrhythmia     Depression         Past Surgical History:   Procedure Laterality Date    BUNIONECTOMY      TONSILLECTOMY      WISDOM TOOTH EXTRACTION            Current Outpatient Medications   Medication Instructions    loratadine (CLARITIN) 10 mg, Daily PRN    multivitamin (THERAGRAN) per tablet 1 tablet, Daily    ondansetron (ZOFRAN-ODT) 4 mg, Oral, Every 6 hours PRN    ZINC ACETATE ORAL Take by mouth.        Review of patient's allergies indicates:   Allergen Reactions    Augmentin [amoxicillin-pot clavulanate]     Oxycodone Itching           Objective:     Vitals:    06/12/25 1014   BP: 110/60   BP Location: Left arm   Patient Position: Sitting   Pulse: 84   SpO2: 99%   Weight: 92.8 kg (204 lb 11.2 oz)   Height: 5' 5" (1.651 m)        Physical Exam    General: No acute distress. Well-developed. Well-nourished.  Eyes: EOMI. Sclerae anicteric.  HENT: Normocephalic. Atraumatic. Nares patent. Moist oral mucosa.  Cardiovascular: Regular rate. Regular rhythm. No murmurs. No rubs. No gallops. Normal S1, S2.  Respiratory: Normal respiratory effort. Clear to auscultation bilaterally. No rales. No rhonchi. No wheezing.  Musculoskeletal: No  obvious deformity.  Extremities: No lower extremity edema.  Neurological: Alert & oriented x3. No slurred speech. Normal gait.  Psychiatric: Normal mood. Normal affect. Good insight. Good judgment.  Skin: Warm. Dry. No rash.         CARDIAC PROFILE  No results found for: "BNP", "CPK", "CPKMB", "LDH", "TROPONINI", "TROPONINIHS"  CMP  Sodium   Date Value Ref " "Range Status   05/08/2025 137 136 - 145 mmol/L Final     Potassium   Date Value Ref Range Status   05/08/2025 3.3 (L) 3.5 - 5.1 mmol/L Final     Chloride   Date Value Ref Range Status   05/08/2025 104 95 - 110 mmol/L Final     CO2   Date Value Ref Range Status   05/08/2025 23 23 - 29 mmol/L Final     Glucose   Date Value Ref Range Status   05/08/2025 105 70 - 110 mg/dL Final     BUN   Date Value Ref Range Status   05/08/2025 9 6 - 20 mg/dL Final     Creatinine   Date Value Ref Range Status   05/08/2025 0.7 0.5 - 1.4 mg/dL Final     Calcium   Date Value Ref Range Status   05/08/2025 9.7 8.7 - 10.5 mg/dL Final     Protein Total   Date Value Ref Range Status   05/08/2025 8.6 (H) 6.0 - 8.4 gm/dL Final     Albumin   Date Value Ref Range Status   05/08/2025 4.5 3.5 - 5.2 g/dL Final     Bilirubin Total   Date Value Ref Range Status   05/08/2025 0.3 0.1 - 1.0 mg/dL Final     Comment:     For infants and newborns, interpretation of results should be based   on gestational age, weight and in agreement with clinical   observations.    Premature Infant recommended reference ranges:   0-24 hours:  <8.0 mg/dL   24-48 hours: <12.0 mg/dL   3-5 days:    <15.0 mg/dL   6-29 days:   <15.0 mg/dL     ALP   Date Value Ref Range Status   05/08/2025 70 55 - 135 unit/L Final     AST   Date Value Ref Range Status   05/08/2025 22 10 - 40 unit/L Final     ALT   Date Value Ref Range Status   05/08/2025 28 10 - 44 unit/L Final     Anion Gap   Date Value Ref Range Status   05/08/2025 10 8 - 16 mmol/L Final     eGFR   Date Value Ref Range Status   05/08/2025 >60 >60 mL/min/1.73/m2 Final      No results found for: "CHOL"  No results found for: "HDL"  No results found for: "LDLCALC"  No results found for: "TRIG"  No results found for: "CHOLHDL"   No results found for: "TSH", "I8INBGS", "O9ZGTEP", "THYROIDAB", "FREET4"  No results found for: "LABA1C", "HGBA1C"  Lab Results   Component Value Date    WBC 12.10 05/08/2025    HGB 13.9 05/08/2025    HCT " 42.5 05/08/2025    MCV 84 05/08/2025     05/08/2025        No results found for this or any previous visit.     No results found for this or any previous visit.       EKG  Results for orders placed or performed during the hospital encounter of 05/08/25   EKG 12-lead    Collection Time: 05/08/25  8:54 PM   Result Value Ref Range    QRS Duration 72 ms    [facility] QTC Calculation 403 ms    Narrative    Test Reason : R00.0,    Vent. Rate : 110 BPM     Atrial Rate : 110 BPM     P-R Int : 144 ms          QRS Dur :  72 ms      QT Int : 298 ms       P-R-T Axes :  60  84  22 degrees    QTcB Int : 403 ms    Sinus tachycardia  Right atrial enlargement  Low voltage QRS  Abnormal ECG  No previous ECGs available  Confirmed by Brenton Smith ([id number]) on 5/9/2025 12:55:47 PM    Referred By: AAAREFERRAL SELF           Confirmed By: Brenton Lopez  No results found for this or any previous visit.           Assessment/Plan:          1. PVC (premature ventricular contraction)    2. Tachycardia    3. Nonspecific abnormal electrocardiogram (ECG) (EKG)      Assessment & Plan    I49.3 Ventricular premature depolarization  I49.9 Cardiac arrhythmia, unspecified  R93.1 Abnormal findings on diagnostic imaging of heart and coronary circulation  R07.89 Other chest pain  R06.00 Dyspnea, unspecified  E87.6 Hypokalemia  B97.89 Other viral agents as the cause of diseases classified elsewhere  T36.8X5A Adverse effect of other systemic antibiotics, initial encounter  Z82.49 Family history of ischemic heart disease and other diseases of the circulatory system  Z88.1 Allergy status to other antibiotic agents  Z57.6 Occupational exposure to extreme temperature    PLAN SUMMARY:  - Ordered fasting labs: metabolic profile, magnesium level, lipid panel, thyroid function  - Ordered echocardiogram to evaluate possible atrial enlargement  - Consider starting OTC potassium supplement based on lab results  - Patient advised to increase intake  of potassium-rich foods  - Follow-up in 1 month to review lab results and discuss further management    VENTRICULAR PREMATURE DEPOLARIZATION (PVCS):  - Current EKG shows PVCs, consistent with 2016 findings.  - Explained PVCs: premature contractions from the lower part of the heart, causing irregular rhythm.  - Discussed potential causes of PVCs, including thyroid issues.    CARDIAC ARRHYTHMIA:  - Considered electrolyte imbalance as potential contributing factor.  - Considered thyroid function as potential contributing factor.  - Considered lipid profile as potential contributing factor.    ABNORMAL FINDINGS ON DIAGNOSTIC IMAGING OF HEART:  - Ordered echocardiogram to evaluate possible left and right atrial enlargement noted on recent hospital EKG.    HYPOKALEMIA:  - Recent low potassium (3.3) noted, which can contribute to irregular heartbeats.  - Patient to increase intake of potassium-rich foods.  - Consider starting OTC potassium supplement if lab results indicate low levels.    LABS:  - Ordered fasting labs: metabolic profile, magnesium level.  - Ordered fasting labs: thyroid function.  - Ordered fasting labs: lipid panel.    FOLLOW-UP:  - Follow up in 1 month to review lab results and discuss further management.           This note was generated with the assistance of ambient listening technology. Verbal consent was obtained by the patient and accompanying visitor(s) for the recording of patient appointment to facilitate this note. I attest to having reviewed and edited the generated note for accuracy, though some syntax or spelling errors may persist. Please contact the author of this note for any clarification.

## 2025-06-14 LAB
BUN SERPL-MCNC: 11 MG/DL (ref 6–20)
BUN/CREAT SERPL: 14 (ref 9–23)
CALCIUM SERPL-MCNC: 9.5 MG/DL (ref 8.7–10.2)
CHLORIDE SERPL-SCNC: 102 MMOL/L (ref 96–106)
CHOLEST SERPL-MCNC: 184 MG/DL (ref 100–199)
CO2 SERPL-SCNC: 25 MMOL/L (ref 20–29)
CREAT SERPL-MCNC: 0.76 MG/DL (ref 0.57–1)
EST. GFR  (NO RACE VARIABLE): 111 ML/MIN/1.73
GLUCOSE SERPL-MCNC: 91 MG/DL (ref 70–99)
HDLC SERPL-MCNC: 47 MG/DL
LDLC SERPL CALC-MCNC: 115 MG/DL (ref 0–99)
MAGNESIUM SERPL-MCNC: 2.1 MG/DL (ref 1.6–2.3)
POTASSIUM SERPL-SCNC: 4 MMOL/L (ref 3.5–5.2)
SODIUM SERPL-SCNC: 138 MMOL/L (ref 134–144)
T4 FREE SERPL-MCNC: 0.77 NG/DL (ref 0.82–1.77)
TRIGL SERPL-MCNC: 124 MG/DL (ref 0–149)
TSH SERPL DL<=0.005 MIU/L-ACNC: 2.54 UIU/ML (ref 0.45–4.5)
VLDLC SERPL CALC-MCNC: 22 MG/DL (ref 5–40)

## 2025-07-01 ENCOUNTER — HOSPITAL ENCOUNTER (OUTPATIENT)
Dept: CARDIOLOGY | Facility: HOSPITAL | Age: 25
Discharge: HOME | End: 2025-07-01
Attending: INTERNAL MEDICINE
Payer: COMMERCIAL

## 2025-07-01 VITALS — BODY MASS INDEX: 33.99 KG/M2 | HEIGHT: 65 IN | WEIGHT: 204 LBS

## 2025-07-01 DIAGNOSIS — I49.3 PVC (PREMATURE VENTRICULAR CONTRACTION): ICD-10-CM

## 2025-07-01 PROCEDURE — 93306 TTE W/DOPPLER COMPLETE: CPT

## 2025-07-01 PROCEDURE — 93306 TTE W/DOPPLER COMPLETE: CPT | Mod: 26,,, | Performed by: INTERNAL MEDICINE

## 2025-07-03 LAB
AORTIC ROOT ANNULUS: 2.1 CM
AORTIC SIZE INDEX: 1 CM/M2
AORTIC VALVE CUSP SEPERATION: 1.68 CM
APICAL FOUR CHAMBER EJECTION FRACTION: 67 %
APICAL TWO CHAMBER EJECTION FRACTION: 68 %
ASCENDING AORTA: 2 CM
AV INDEX (PROSTH): 0.71
AV MEAN GRADIENT: 5 MMHG
AV PEAK GRADIENT: 8 MMHG
AV VALVE AREA BY VELOCITY RATIO: 1.8 CM²
AV VALVE AREA: 2 CM²
AV VELOCITY RATIO: 0.64
BSA FOR ECHO PROCEDURE: 2.06 M2
CV ECHO LV RWT: 0.35 CM
DOP CALC AO PEAK VEL: 1.4 M/S
DOP CALC AO VTI: 25.6 CM
DOP CALC LVOT AREA: 2.8 CM2
DOP CALC LVOT DIAMETER: 1.9 CM
DOP CALC LVOT PEAK VEL: 0.9 M/S
DOP CALC LVOT STROKE VOLUME: 51.6 CM3
DOP CALC MV VTI: 21.8 CM
DOP CALCLVOT PEAK VEL VTI: 18.2 CM
E WAVE DECELERATION TIME: 188 MSEC
E/A RATIO: 1.3
E/E' RATIO: 5 M/S
ECHO LV POSTERIOR WALL: 0.9 CM (ref 0.6–1.1)
EJECTION FRACTION: 68 %
FRACTIONAL SHORTENING: 40.4 % (ref 28–44)
INTERVENTRICULAR SEPTUM: 0.9 CM (ref 0.6–1.1)
IVC DIAMETER: 1.61 CM
LA MAJOR: 4.4 CM
LA MINOR: 4.4 CM
LEFT ATRIUM AREA SYSTOLIC (APICAL 2 CHAMBER): 15.18 CM2
LEFT ATRIUM AREA SYSTOLIC (APICAL 4 CHAMBER): 15.99 CM2
LEFT ATRIUM SIZE: 3.9 CM
LEFT ATRIUM VOLUME INDEX MOD: 20 ML/M2
LEFT ATRIUM VOLUME MOD: 40 ML
LEFT INTERNAL DIMENSION IN SYSTOLE: 3.1 CM (ref 2.1–4)
LEFT VENTRICLE DIASTOLIC VOLUME INDEX: 64.32 ML/M2
LEFT VENTRICLE DIASTOLIC VOLUME: 128 ML
LEFT VENTRICLE END DIASTOLIC VOLUME APICAL 2 CHAMBER: 89.77 ML
LEFT VENTRICLE END DIASTOLIC VOLUME APICAL 4 CHAMBER: 98.5 ML
LEFT VENTRICLE END SYSTOLIC VOLUME APICAL 2 CHAMBER: 39.72 ML
LEFT VENTRICLE END SYSTOLIC VOLUME APICAL 4 CHAMBER: 40.5 ML
LEFT VENTRICLE MASS INDEX: 84.9 G/M2
LEFT VENTRICLE SYSTOLIC VOLUME INDEX: 19.1 ML/M2
LEFT VENTRICLE SYSTOLIC VOLUME: 38 ML
LEFT VENTRICULAR INTERNAL DIMENSION IN DIASTOLE: 5.2 CM (ref 3.5–6)
LEFT VENTRICULAR MASS: 169 G
LV LATERAL E/E' RATIO: 5.2 M/S
LV SEPTAL E/E' RATIO: 5.5 M/S
LVED V (TEICH): 127.56 ML
LVES V (TEICH): 38.23 ML
LVOT MG: 1.84 MMHG
LVOT MV: 0.63 CM/S
MV MEAN GRADIENT: 2 MMHG
MV PEAK A VEL: 0.64 M/S
MV PEAK E VEL: 0.83 M/S
MV PEAK GRADIENT: 5 MMHG
MV STENOSIS PRESSURE HALF TIME: 61.76 MS
MV VALVE AREA BY CONTINUITY EQUATION: 2.37 CM2
MV VALVE AREA P 1/2 METHOD: 3.56 CM2
OHS LV EJECTION FRACTION SIMPSONS BIPLANE MOD: 68 %
PISA MRMAX VEL: 4.52 M/S
PISA TR MAX VEL: 2.5 M/S
PV MV: 0.74 M/S
PV PEAK GRADIENT: 4 MMHG
PV PEAK VELOCITY: 0.99 M/S
RA MAJOR: 4.65 CM
RA PRESSURE ESTIMATED: 3 MMHG
RA VOL SYS: 39.27 ML
RIGHT ATRIAL AREA: 14.1 CM2
RIGHT ATRIUM END SYSTOLIC VOLUME APICAL 4 CHAMBER INDEX BSA: 19.46 ML/M2
RIGHT ATRIUM VOLUME AREA LENGTH APICAL 4 CHAMBER: 38.73 ML
RV TB RVSP: 6 MMHG
RV TISSUE DOPPLER FREE WALL SYSTOLIC VELOCITY 1 (APICAL 4 CHAMBER VIEW): 13.69 CM/S
STJ: 2 CM
TDI LATERAL: 0.16 M/S
TDI SEPTAL: 0.15 M/S
TDI: 0.16 M/S
TR MAX PG: 24 MMHG
TV REST PULMONARY ARTERY PRESSURE: 28 MMHG
Z-SCORE OF LEFT VENTRICULAR DIMENSION IN END DIASTOLE: -1.04
Z-SCORE OF LEFT VENTRICULAR DIMENSION IN END SYSTOLE: -1.06

## 2025-07-09 ENCOUNTER — OFFICE VISIT (OUTPATIENT)
Dept: CARDIOLOGY | Facility: CLINIC | Age: 25
End: 2025-07-09
Payer: COMMERCIAL

## 2025-07-09 VITALS
HEART RATE: 89 BPM | SYSTOLIC BLOOD PRESSURE: 122 MMHG | WEIGHT: 202.81 LBS | DIASTOLIC BLOOD PRESSURE: 70 MMHG | BODY MASS INDEX: 33.79 KG/M2 | HEIGHT: 65 IN | OXYGEN SATURATION: 99 %

## 2025-07-09 DIAGNOSIS — I49.3 PVC (PREMATURE VENTRICULAR CONTRACTION): Primary | ICD-10-CM

## 2025-07-09 DIAGNOSIS — R94.31 NONSPECIFIC ABNORMAL ELECTROCARDIOGRAM (ECG) (EKG): ICD-10-CM

## 2025-07-09 DIAGNOSIS — R00.0 TACHYCARDIA: ICD-10-CM

## 2025-07-09 PROCEDURE — 99213 OFFICE O/P EST LOW 20 MIN: CPT | Mod: S$GLB,,, | Performed by: INTERNAL MEDICINE

## 2025-07-09 PROCEDURE — 99999 PR PBB SHADOW E&M-EST. PATIENT-LVL IV: CPT | Mod: PBBFAC,,, | Performed by: INTERNAL MEDICINE

## 2025-07-09 PROCEDURE — 3074F SYST BP LT 130 MM HG: CPT | Mod: CPTII,S$GLB,, | Performed by: INTERNAL MEDICINE

## 2025-07-09 PROCEDURE — 3078F DIAST BP <80 MM HG: CPT | Mod: CPTII,S$GLB,, | Performed by: INTERNAL MEDICINE

## 2025-07-09 PROCEDURE — 3008F BODY MASS INDEX DOCD: CPT | Mod: CPTII,S$GLB,, | Performed by: INTERNAL MEDICINE

## 2025-07-09 PROCEDURE — 1159F MED LIST DOCD IN RCRD: CPT | Mod: CPTII,S$GLB,, | Performed by: INTERNAL MEDICINE

## 2025-07-09 PROCEDURE — 1160F RVW MEDS BY RX/DR IN RCRD: CPT | Mod: CPTII,S$GLB,, | Performed by: INTERNAL MEDICINE

## 2025-07-09 RX ORDER — DIPHENHYDRAMINE HCL 25 MG
25 CAPSULE ORAL EVERY 6 HOURS PRN
COMMUNITY

## 2025-07-09 NOTE — PROGRESS NOTES
Patient ID:  Marizol Corbin is a 24 y.o. female who presents with Results (Echo & labs) and PVC      History of Present Illness    CHIEF COMPLAINT:  Patient presents today for follow up of PVCs and arrhythmia.    HISTORY OF PRESENT ILLNESS:  She has a history of arrhythmia initially diagnosed in 2016 following surgery. Despite being initially told the condition would resolve, it has persisted. She describes experiencing palpitations described as fluttering and PVCs associated with dizziness, nausea, and weakness. She has experienced increased problems with dizziness over the last 6 months. She also experiences vertigo characterized by a sensation of movement when lying down or turning her head quickly. Symptoms are intermittent, not occurring nightly. Notably, she denies arrhythmia symptoms during physical activity such as walking and remains relatively active. She has not been overly concerned about symptoms until recently.    MEDICAL HISTORY:  She has experienced difficulty obtaining comprehensive medical evaluation, with previous primary care physicians attributing symptoms to anxiety. She is motivated to understand and address the ongoing cardiac irregularities.    FAMILY HISTORY:  Strong family history of cardiac conditions. Maternal side: Mother has second naturally occurring pacemaker, limited additional information due to her being adopted. Paternal side: Father had multiple cardiac events including 3 heart attacks by age 50 and underwent CABG 2-3 years ago. Paternal grandfather  from heart attack or cardiac-related issues. Paternal uncle was diagnosed with diabetes and cardiac conditions. Family history suggests cardiac problems affecting multiple generations, particularly on paternal side, with potential genetic predisposition to cardiac disease.    CURRENT MEDICATIONS:  Benadryl (used recently for sleep aid related to anxiety), Claritin, multivitamins, and zinc.    DIET:  She currently consumes  "frequent burgers and fast food and does not eat seafood. She is actively attempting to improve dietary habits by incorporating grilled chicken, reducing fast food intake, and returning to meal prepping. She acknowledges challenges with long work hours but is making efforts to create healthier eating patterns.      ROS:  General: +weakness  Cardiovascular: +palpitations, +feeling of flutter in chest  Gastrointestinal: +nausea  Neurological: +dizziness, +vertigo  Psychiatric: +anxiety           Past Medical History:   Diagnosis Date    Anxiety     Arrhythmia     Depression         Past Surgical History:   Procedure Laterality Date    BUNIONECTOMY      TONSILLECTOMY      WISDOM TOOTH EXTRACTION            Current Outpatient Medications   Medication Instructions    diphenhydrAMINE (BENADRYL) 25 mg, Every 6 hours PRN    loratadine (CLARITIN) 10 mg, Daily PRN    multivitamin (THERAGRAN) per tablet 1 tablet, Daily    ZINC ACETATE ORAL Take by mouth.        Review of patient's allergies indicates:   Allergen Reactions    Augmentin [amoxicillin-pot clavulanate]     Oxycodone Itching           Objective:     Vitals:    07/09/25 1539   BP: 122/70   BP Location: Left arm   Patient Position: Sitting   Pulse: 89   SpO2: 99%   Weight: 92 kg (202 lb 13.2 oz)   Height: 5' 5" (1.651 m)        Physical Exam    General: No acute distress. Well-developed. Well-nourished.  Eyes: EOMI. Sclerae anicteric.  HENT: Normocephalic. Atraumatic. Nares patent. Moist oral mucosa.  Cardiovascular: Regular rate. Regular rhythm. No murmurs. No rubs. No gallops. Normal S1, S2.  Respiratory: Normal respiratory effort. Clear to auscultation bilaterally. No rales. No rhonchi. No wheezing.  Musculoskeletal: No  obvious deformity.  Extremities: No lower extremity edema.  Neurological: Alert & oriented x3. No slurred speech. Normal gait.  Psychiatric: Normal mood. Normal affect. Good insight. Good judgment.  Skin: Warm. Dry. No rash.         CARDIAC " "PROFILE  No results found for: "BNP", "CPK", "CPKMB", "LDH", "TROPONINI", "TROPONINIHS"  CMP  Sodium   Date Value Ref Range Status   06/13/2025 138 134 - 144 mmol/L Final   05/08/2025 137 136 - 145 mmol/L Final     Potassium   Date Value Ref Range Status   06/13/2025 4.0 3.5 - 5.2 mmol/L Final   05/08/2025 3.3 (L) 3.5 - 5.1 mmol/L Final     Chloride   Date Value Ref Range Status   06/13/2025 102 96 - 106 mmol/L Final   05/08/2025 104 95 - 110 mmol/L Final     CO2   Date Value Ref Range Status   06/13/2025 25 20 - 29 mmol/L Final   05/08/2025 23 23 - 29 mmol/L Final     Glucose   Date Value Ref Range Status   06/13/2025 91 70 - 99 mg/dL Final   05/08/2025 105 70 - 110 mg/dL Final     BUN   Date Value Ref Range Status   06/13/2025 11 6 - 20 mg/dL Final   05/08/2025 9 6 - 20 mg/dL Final     Creatinine   Date Value Ref Range Status   06/13/2025 0.76 0.57 - 1.00 mg/dL Final   05/08/2025 0.7 0.5 - 1.4 mg/dL Final     Calcium   Date Value Ref Range Status   06/13/2025 9.5 8.7 - 10.2 mg/dL Final   05/08/2025 9.7 8.7 - 10.5 mg/dL Final     Protein Total   Date Value Ref Range Status   05/08/2025 8.6 (H) 6.0 - 8.4 gm/dL Final     Albumin   Date Value Ref Range Status   05/08/2025 4.5 3.5 - 5.2 g/dL Final     Bilirubin Total   Date Value Ref Range Status   05/08/2025 0.3 0.1 - 1.0 mg/dL Final     Comment:     For infants and newborns, interpretation of results should be based   on gestational age, weight and in agreement with clinical   observations.    Premature Infant recommended reference ranges:   0-24 hours:  <8.0 mg/dL   24-48 hours: <12.0 mg/dL   3-5 days:    <15.0 mg/dL   6-29 days:   <15.0 mg/dL     ALP   Date Value Ref Range Status   05/08/2025 70 55 - 135 unit/L Final     AST   Date Value Ref Range Status   05/08/2025 22 10 - 40 unit/L Final     ALT   Date Value Ref Range Status   05/08/2025 28 10 - 44 unit/L Final     Anion Gap   Date Value Ref Range Status   05/08/2025 10 8 - 16 mmol/L Final     eGFR   Date Value " "Ref Range Status   06/13/2025 111 >59 mL/min/1.73 Final      Lab Results   Component Value Date    CHOL 184 06/13/2025     Lab Results   Component Value Date    HDL 47 06/13/2025     Lab Results   Component Value Date    LDLCALC 115 (H) 06/13/2025     Lab Results   Component Value Date    TRIG 124 06/13/2025     No results found for: "CHOLHDL"   Lab Results   Component Value Date    TSH 2.540 06/13/2025     No results found for: "LABA1C", "HGBA1C"  Lab Results   Component Value Date    WBC 12.10 05/08/2025    HGB 13.9 05/08/2025    HCT 42.5 05/08/2025    MCV 84 05/08/2025     05/08/2025        Results for orders placed during the hospital encounter of 07/01/25    Echo Saline Bubble? No    Interpretation Summary    Left Ventricle: The left ventricle is normal in size. Normal wall thickness. There is normal systolic function. Ejection fraction is approximately 68%. There is normal diastolic function.    Right Ventricle: The right ventricle is normal in sizeWall thickness is normal. Systolic function is normal.    Tricuspid Valve: There is mild regurgitation.    IVC/SVC: Normal venous pressure at 3 mmHg.    The estimated pulmonary artery systolic pressure is 28 mmHg.     No results found for this or any previous visit.       EKG  Results for orders placed or performed in visit on 06/12/25   IN OFFICE EKG 12-LEAD (to [place])    Collection Time: 06/12/25 10:54 AM   Result Value Ref Range    QRS Duration 82 ms    [facility] QTC Calculation 413 ms    Narrative    Test Reason : I49.3,    Vent. Rate :  83 BPM     Atrial Rate :  83 BPM     P-R Int : 156 ms          QRS Dur :  82 ms      QT Int : 352 ms       P-R-T Axes :  29  -7  -1 degrees    QTcB Int : 413 ms    Sinus rhythm with frequent Premature ventricular complexes  Cannot rule out Anterior infarct (cited on or before 08-May-2025)  Abnormal ECG  When compared with ECG of 08-May-2025 20:54,  Premature ventricular complexes are now Present  Questionable change in The " axis  Nonspecific T wave abnormality now evident in Anterior leads    Referred By: GINA           Confirmed By:         Stress  No results found for this or any previous visit.           Assessment/Plan:          1. PVC (premature ventricular contraction)    2. Tachycardia    3. Nonspecific abnormal electrocardiogram (ECG) (EKG)      Assessment & Plan    I49.3 Ventricular premature depolarization  I49.9 Cardiac arrhythmia, unspecified  H81.10 Benign paroxysmal vertigo, unspecified ear  E78.5 Hyperlipidemia, unspecified  Z82.49 Family history of ischemic heart disease and other diseases of the circulatory system  Z83.3 Family history of diabetes mellitus    PLAN SUMMARY:  - Order cardiac event monitor for 3-5 days  - Schedule treadmill test to evaluate arrhythmias  - Implement dietary changes to lower cholesterol  - Repeat thyroid testing in 3-4 months  - Follow up in a few months after completing ordered tests    VENTRICULAR PREMATURE DEPOLARIZATION AND CARDIAC ARRHYTHMIA:  - Reviewed history of PVCs and low potassium, noting normal potassium levels on recent bloodwork.  - Non-specific changes on ECG and presence of PVCs during exam noted, with PVCs disappearing during exercise being a good prognostic sign.  - Ordered cardiac event monitor for 3-5 days and treadmill test to further evaluate arrhythmias.    HYPERLIPIDEMIA:  - Total cholesterol 184 (WNL),  (slightly elevated).  - Explained cholesterol levels and their significance, including dietary sources of cholesterol (shellfish, egg yolks, fried foods, and red meat).  - Patient to implement dietary changes to lower cholesterol: reduce consumption of burgers and other high-cholesterol foods, increase intake of grilled chicken and other lean proteins, consider meal prepping to avoid reliance on fast food, and choose lower cholesterol options like grilled chicken or fish when eating out.    BENIGN PAROXYSMAL VERTIGO:  - Considered middle ear vertigo as  potential cause for dizziness symptoms when lying down or turning head.    SUBCLINICAL HYPOTHYROIDISM:  - Thyroid function slightly low T4 with normal TSH; will repeat thyroid testing in 3-4 months to rule out lab error.  - Explained relationship between TSH and T4 in thyroid function.    FOLLOW-UP:  - Follow up in a few months after completing ordered tests.           This note was generated with the assistance of ambient listening technology. Verbal consent was obtained by the patient and accompanying visitor(s) for the recording of patient appointment to facilitate this note. I attest to having reviewed and edited the generated note for accuracy, though some syntax or spelling errors may persist. Please contact the author of this note for any clarification.

## 2025-07-10 ENCOUNTER — TELEPHONE (OUTPATIENT)
Dept: CARDIOLOGY | Facility: HOSPITAL | Age: 25
End: 2025-07-10

## 2025-07-10 NOTE — TELEPHONE ENCOUNTER
Left message on voicemail.   Patient advised, test will be at [facility] ([address]  Will need to register on the first floor at the main entrance.  Patient advised that arrival time is 09:00.  Patient advised, may take their medications prior to testing if you need to. Advised if medications are taken with food, please keep it light, like toast and juice.    Patient advised to avoid all caffeine 12 hours prior to testing.  This includes decaf tea and coffee.    Wear comfortable clothing with tennis shoes.  No lotions, oils, or powders to the upper chest area. May wear deodorant.    Patient advised that an IV will be placed for safety.  Instructions sent to [facility].

## 2025-07-11 ENCOUNTER — HOSPITAL ENCOUNTER (OUTPATIENT)
Dept: CARDIOLOGY | Facility: HOSPITAL | Age: 25
Discharge: HOME | End: 2025-07-11
Attending: INTERNAL MEDICINE
Payer: COMMERCIAL

## 2025-07-11 ENCOUNTER — HOSPITAL ENCOUNTER (OUTPATIENT)
Dept: CARDIOLOGY | Facility: CLINIC | Age: 25
Discharge: HOME | End: 2025-07-11
Attending: INTERNAL MEDICINE
Payer: COMMERCIAL

## 2025-07-11 DIAGNOSIS — R00.0 TACHYCARDIA: ICD-10-CM

## 2025-07-11 DIAGNOSIS — I49.3 PVC (PREMATURE VENTRICULAR CONTRACTION): ICD-10-CM

## 2025-07-11 DIAGNOSIS — R94.31 NONSPECIFIC ABNORMAL ELECTROCARDIOGRAM (ECG) (EKG): ICD-10-CM

## 2025-07-11 LAB
CV STRESS BASE HR: 90 BPM
DIASTOLIC BLOOD PRESSURE: 66 MMHG
OHS CV CPX 1 MINUTE RECOVERY HEART RATE: 144 BPM
OHS CV CPX 85 PERCENT MAX PREDICTED HEART RATE MALE: 166
OHS CV CPX ESTIMATED METS: 10
OHS CV CPX MAX PREDICTED HEART RATE: 195
OHS CV CPX PATIENT IS FEMALE: 1
OHS CV CPX PATIENT IS MALE: 0
OHS CV CPX PEAK DIASTOLIC BLOOD PRESSURE: 60 MMHG
OHS CV CPX PEAK HEAR RATE: 166 BPM
OHS CV CPX PEAK RATE PRESSURE PRODUCT: NORMAL
OHS CV CPX PEAK SYSTOLIC BLOOD PRESSURE: 185 MMHG
OHS CV CPX PERCENT MAX PREDICTED HEART RATE ACHIEVED: 90
OHS CV CPX RATE PRESSURE PRODUCT PRESENTING: NORMAL
STRESS ECHO POST EXERCISE DUR MIN: 8 MINUTES
STRESS ECHO POST EXERCISE DUR SEC: 59 SECONDS
SYSTOLIC BLOOD PRESSURE: 134 MMHG

## 2025-07-11 PROCEDURE — 93017 CV STRESS TEST TRACING ONLY: CPT

## 2025-07-18 LAB
OHS CV EVENT MONITOR DAY: 4
OHS CV HOLTER HOOKUP DATE: NORMAL
OHS CV HOLTER HOOKUP TIME: NORMAL
OHS CV HOLTER LENGTH DECIMAL HOURS: 118
OHS CV HOLTER LENGTH HOURS: 22
OHS CV HOLTER LENGTH MINUTES: 0
OHS CV HOLTER SCAN DATE: NORMAL
OHS CV HOLTER SINUS AVERAGE HR: 95 BPM
OHS CV HOLTER SINUS MAX HR: 168 BPM
OHS CV HOLTER SINUS MIN HR: 64 BPM
OHS CV HOLTER STUDY END DATE: NORMAL
OHS CV HOLTER STUDY END TIME: NORMAL